# Patient Record
Sex: MALE | Race: WHITE | NOT HISPANIC OR LATINO | Employment: UNEMPLOYED | URBAN - NONMETROPOLITAN AREA
[De-identification: names, ages, dates, MRNs, and addresses within clinical notes are randomized per-mention and may not be internally consistent; named-entity substitution may affect disease eponyms.]

---
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
## 2022-07-08 ENCOUNTER — TELEPHONE (OUTPATIENT)
Dept: FAMILY MEDICINE CLINIC | Facility: CLINIC | Age: 22
End: 2022-07-08

## 2023-07-31 ENCOUNTER — OFFICE VISIT (OUTPATIENT)
Dept: FAMILY MEDICINE CLINIC | Facility: CLINIC | Age: 23
End: 2023-07-31
Payer: COMMERCIAL

## 2023-07-31 VITALS
TEMPERATURE: 99.3 F | WEIGHT: 136.2 LBS | DIASTOLIC BLOOD PRESSURE: 72 MMHG | HEIGHT: 68 IN | HEART RATE: 110 BPM | OXYGEN SATURATION: 97 % | RESPIRATION RATE: 20 BRPM | BODY MASS INDEX: 20.64 KG/M2 | SYSTOLIC BLOOD PRESSURE: 124 MMHG

## 2023-07-31 DIAGNOSIS — J34.2 DEVIATED SEPTUM: ICD-10-CM

## 2023-07-31 DIAGNOSIS — Z00.00 ANNUAL PHYSICAL EXAM: Primary | ICD-10-CM

## 2023-07-31 DIAGNOSIS — F41.9 ANXIETY: ICD-10-CM

## 2023-07-31 PROCEDURE — 99395 PREV VISIT EST AGE 18-39: CPT | Performed by: NURSE PRACTITIONER

## 2023-07-31 NOTE — PROGRESS NOTES
"Chief Complaint  Annual Exam    Subjective        Tristian Velázquez presents to [facility]  History of Present Illness    The patient is a 22-year-old male who presents to the clinic today for an annual exam.    The patient reports that he believes he has a deviated septum because it is always harder to breathe on the left side of his nostril. He has not had an evaluation in regards to this. The patient reports that it does not bother him that much.    The patient denies smoking, drinking, or using drugs. He states that he is not sexually active. The patient reports that he exercises 4 days a week. He states that he does weightlifting. He monitors his sugar intake and sodium intake. The patient reports that he does not eat fruits and vegetables. He states that he wears his seatbelt all the time. The patient denies any high-risk activities like skydiving or bass jumping. He states that he is still playing music. The patient reports that he is urinating and having normal bowel movements. He denies any issues with his penis or testicles.    The patient reports that his anxiety has improved. He states that he does not take propranolol at all. He notes that he did have a job interview that was pretty bad. The patient reports that he did not think to take the propranolol prior to the interview.     Objective   Vital Signs:  /72 (BP Location: Left arm, Patient Position: Sitting, Cuff Size: Adult)   Pulse 110   Temp 99.3 øF (37.4 øC) (Infrared)   Resp 20   Ht 172.7 cm (68\")   Wt 61.8 kg (136 lb 3.2 oz)   SpO2 97%   BMI 20.71 kg/mý   Estimated body mass index is 20.71 kg/mý as calculated from the following:    Height as of this encounter: 172.7 cm (68\").    Weight as of this encounter: 61.8 kg (136 lb 3.2 oz).  Facility age limit for growth percentiles is 20 years.    BMI is within normal parameters. No other follow-up for BMI required.      Physical Exam  Vitals and nursing note " reviewed.   Constitutional:       General: He is not in acute distress.     Appearance: He is well-developed.   HENT:      Head: Normocephalic and atraumatic.      Right Ear: Tympanic membrane and ear canal normal.      Left Ear: Tympanic membrane and ear canal normal.      Nose: Nose normal.      Right Sinus: No maxillary sinus tenderness or frontal sinus tenderness.      Left Sinus: No maxillary sinus tenderness or frontal sinus tenderness.      Mouth/Throat:      Mouth: Mucous membranes are moist.      Pharynx: Oropharynx is clear. Uvula midline. No uvula swelling.   Eyes:      Conjunctiva/sclera: Conjunctivae normal.   Neck:      Thyroid: No thyromegaly.      Trachea: No tracheal deviation.   Cardiovascular:      Rate and Rhythm: Normal rate and regular rhythm.      Pulses: Normal pulses.      Heart sounds: Normal heart sounds.   Pulmonary:      Effort: Pulmonary effort is normal.      Breath sounds: Normal breath sounds.   Abdominal:      General: Bowel sounds are normal.      Palpations: Abdomen is soft. There is no mass.      Tenderness: There is no abdominal tenderness.   Musculoskeletal:      Cervical back: Normal range of motion and neck supple.   Lymphadenopathy:      Cervical: No cervical adenopathy.   Skin:     General: Skin is warm and dry.   Neurological:      General: No focal deficit present.      Mental Status: He is alert and oriented to person, place, and time.   Psychiatric:         Mood and Affect: Mood normal.         Behavior: Behavior normal.      Result Review :                   Assessment and Plan   Diagnoses and all orders for this visit:    1. Annual physical exam (Primary)  -     CBC & Differential  -     Lipid panel  -     Comprehensive metabolic panel    2. Deviated septum    3. Anxiety    1. Annual exam  - Patient will obtain laboratories today.   - Advised the patient to do a testicular examination once a month.  - Advised to add fruits and vegetables to his diet.    Counseling/Anticipatory Guidance: encouraged healthy nutrition, physical activity, healthy weight, injury prevention, continue avoidance of misuse of tobacco, alcohol and drugs, discussed healthy sexual behavior and STDs, encouraged routine dental health, mental health visits, discussed recommended immunizations, screenings via hm.   For Women: Breast cancer and self breast exams  Screening Services: Cholesterol every five years beginning at 20 years of age, chlamydia for sexually active women under 25 years of age, cervical cancer, HIV      2. Deviated septum  - Continue to monitor symptoms. offered referral to an Ears, Nose and Throat specialist if his symptoms worsen and become bothersome.     3. Anxiety  - chronic stable         Follow Up   Return in about 1 year (around 7/31/2024) for Annual physical.  Patient was given instructions and counseling regarding his condition or for health maintenance advice. Please see specific information pulled into the AVS if appropriate.       Transcribed from ambient dictation for HARDIK Diaz by Peg Diaz.  07/31/23   13:26 CDT    Patient or patient representative verbalized consent to the visit recording.  I have personally performed the services described in this document as transcribed by the above individual, and it is both accurate and complete.

## 2023-08-01 LAB
ALBUMIN SERPL-MCNC: 5.1 G/DL (ref 3.5–5.2)
ALBUMIN/GLOB SERPL: 2.4 G/DL
ALP SERPL-CCNC: 166 U/L (ref 39–117)
ALT SERPL-CCNC: 25 U/L (ref 1–41)
AST SERPL-CCNC: 21 U/L (ref 1–40)
BASOPHILS # BLD AUTO: 0.12 10*3/MM3 (ref 0–0.2)
BASOPHILS NFR BLD AUTO: 1.5 % (ref 0–1.5)
BILIRUB SERPL-MCNC: 0.5 MG/DL (ref 0–1.2)
BUN SERPL-MCNC: 9 MG/DL (ref 6–20)
BUN/CREAT SERPL: 10.2 (ref 7–25)
CALCIUM SERPL-MCNC: 10 MG/DL (ref 8.6–10.5)
CHLORIDE SERPL-SCNC: 102 MMOL/L (ref 98–107)
CHOLEST SERPL-MCNC: 150 MG/DL (ref 0–200)
CO2 SERPL-SCNC: 24.4 MMOL/L (ref 22–29)
CREAT SERPL-MCNC: 0.88 MG/DL (ref 0.76–1.27)
EGFRCR SERPLBLD CKD-EPI 2021: 126.2 ML/MIN/1.73
EOSINOPHIL # BLD AUTO: 0.33 10*3/MM3 (ref 0–0.4)
EOSINOPHIL NFR BLD AUTO: 4.3 % (ref 0.3–6.2)
ERYTHROCYTE [DISTWIDTH] IN BLOOD BY AUTOMATED COUNT: 12.8 % (ref 12.3–15.4)
GLOBULIN SER CALC-MCNC: 2.1 GM/DL
GLUCOSE SERPL-MCNC: 85 MG/DL (ref 65–99)
HCT VFR BLD AUTO: 50.6 % (ref 37.5–51)
HDLC SERPL-MCNC: 38 MG/DL (ref 40–60)
HGB BLD-MCNC: 17.1 G/DL (ref 13–17.7)
IMM GRANULOCYTES # BLD AUTO: 0.02 10*3/MM3 (ref 0–0.05)
IMM GRANULOCYTES NFR BLD AUTO: 0.3 % (ref 0–0.5)
LDLC SERPL CALC-MCNC: 99 MG/DL (ref 0–100)
LYMPHOCYTES # BLD AUTO: 2.77 10*3/MM3 (ref 0.7–3.1)
LYMPHOCYTES NFR BLD AUTO: 35.7 % (ref 19.6–45.3)
MCH RBC QN AUTO: 29.9 PG (ref 26.6–33)
MCHC RBC AUTO-ENTMCNC: 33.8 G/DL (ref 31.5–35.7)
MCV RBC AUTO: 88.6 FL (ref 79–97)
MONOCYTES # BLD AUTO: 0.59 10*3/MM3 (ref 0.1–0.9)
MONOCYTES NFR BLD AUTO: 7.6 % (ref 5–12)
NEUTROPHILS # BLD AUTO: 3.92 10*3/MM3 (ref 1.7–7)
NEUTROPHILS NFR BLD AUTO: 50.6 % (ref 42.7–76)
NRBC BLD AUTO-RTO: 0 /100 WBC (ref 0–0.2)
PLATELET # BLD AUTO: 363 10*3/MM3 (ref 140–450)
POTASSIUM SERPL-SCNC: 4.2 MMOL/L (ref 3.5–5.2)
PROT SERPL-MCNC: 7.2 G/DL (ref 6–8.5)
RBC # BLD AUTO: 5.71 10*6/MM3 (ref 4.14–5.8)
SODIUM SERPL-SCNC: 141 MMOL/L (ref 136–145)
TRIGL SERPL-MCNC: 65 MG/DL (ref 0–150)
VLDLC SERPL CALC-MCNC: 13 MG/DL (ref 5–40)
WBC # BLD AUTO: 7.75 10*3/MM3 (ref 3.4–10.8)

## 2023-08-15 ENCOUNTER — TELEPHONE (OUTPATIENT)
Dept: FAMILY MEDICINE CLINIC | Facility: CLINIC | Age: 23
End: 2023-08-15
Payer: COMMERCIAL

## 2024-01-24 ENCOUNTER — TELEPHONE (OUTPATIENT)
Dept: FAMILY MEDICINE CLINIC | Facility: CLINIC | Age: 24
End: 2024-01-24
Payer: COMMERCIAL

## 2024-01-24 NOTE — TELEPHONE ENCOUNTER
Caller: RUPAL DAVALOS     Relationship: MOTHER     Best call back number: [phone]    What is the medical concern/diagnosis: DEVIATED SEPTUM     What specialty or service is being requested: ENT     What is the provider, practice or medical service name: DID NOT STATE     What is the office location: DID NOT STATE     What is the office phone number:  DID NOT STATE     Any additional details: CALL BACK REQUESTED

## 2024-01-29 ENCOUNTER — OFFICE VISIT (OUTPATIENT)
Dept: FAMILY MEDICINE CLINIC | Facility: CLINIC | Age: 24
End: 2024-01-29
Payer: COMMERCIAL

## 2024-01-29 VITALS
TEMPERATURE: 97.8 F | OXYGEN SATURATION: 98 % | BODY MASS INDEX: 21.67 KG/M2 | RESPIRATION RATE: 20 BRPM | HEIGHT: 68 IN | WEIGHT: 143 LBS | SYSTOLIC BLOOD PRESSURE: 125 MMHG | HEART RATE: 97 BPM | DIASTOLIC BLOOD PRESSURE: 76 MMHG

## 2024-01-29 DIAGNOSIS — J34.2 DEVIATED SEPTUM: Primary | ICD-10-CM

## 2024-01-29 PROCEDURE — 99213 OFFICE O/P EST LOW 20 MIN: CPT | Performed by: NURSE PRACTITIONER

## 2024-01-29 NOTE — PROGRESS NOTES
"Chief Complaint  Deviated Septum (Pt states that he has a deviated septum and wants a referral to ENT )    Subjective        Tristian Velázquez presents to [facility]  History of Present Illness  Here for referral  Reports he is noticing more issues with deviated septum  No injury  Reports when he is laying down, especially on right side has trouble breathing through his nose   Reports left nostril gets blocked       Objective   Vital Signs:  /76 (BP Location: Right arm, Patient Position: Sitting, Cuff Size: Adult)   Pulse 97   Temp 97.8 °F (36.6 °C) (Infrared)   Resp 20   Ht 172.7 cm (68\")   Wt 64.9 kg (143 lb)   SpO2 98%   BMI 21.74 kg/m²   Estimated body mass index is 21.74 kg/m² as calculated from the following:    Height as of this encounter: 172.7 cm (68\").    Weight as of this encounter: 64.9 kg (143 lb).       BMI is within normal parameters. No other follow-up for BMI required.      Physical Exam  Vitals and nursing note reviewed.   Constitutional:       Appearance: He is well-developed.   HENT:      Head: Normocephalic and atraumatic.      Nose: Nasal deformity, septal deviation and congestion present. No nasal tenderness.      Left Turbinates: Enlarged.   Eyes:      Conjunctiva/sclera: Conjunctivae normal.   Cardiovascular:      Rate and Rhythm: Normal rate and regular rhythm.   Pulmonary:      Effort: Pulmonary effort is normal.   Musculoskeletal:      Cervical back: Normal range of motion.   Neurological:      General: No focal deficit present.      Mental Status: He is alert and oriented to person, place, and time.   Psychiatric:         Mood and Affect: Mood normal.         Behavior: Behavior normal.        Result Review :                     Assessment and Plan     Diagnoses and all orders for this visit:    1. Deviated septum (Primary)  -     Ambulatory Referral to ENT (Otolaryngology)               Follow Up     Return if symptoms worsen or fail to " improve.  Patient was given instructions and counseling regarding his condition or for health maintenance advice. Please see specific information pulled into the AVS if appropriate.         Answers submitted by the patient for this visit:  Primary Reason for Visit (Submitted on 1/24/2024)  What is the primary reason for your visit?: Other  Other (Submitted on 1/24/2024)  Please describe your symptoms.: Trouble breathing through nose  Have you had these symptoms before?: Yes  How long have you been having these symptoms?: Greater than 2 weeks  Please describe any probable cause for these symptoms. : Deviated septum

## 2024-03-11 ENCOUNTER — OFFICE VISIT (OUTPATIENT)
Dept: OTOLARYNGOLOGY | Facility: CLINIC | Age: 24
End: 2024-03-11
Payer: COMMERCIAL

## 2024-03-11 VITALS
HEART RATE: 81 BPM | WEIGHT: 145 LBS | BODY MASS INDEX: 21.98 KG/M2 | DIASTOLIC BLOOD PRESSURE: 72 MMHG | SYSTOLIC BLOOD PRESSURE: 118 MMHG | TEMPERATURE: 98 F | HEIGHT: 68 IN

## 2024-03-11 DIAGNOSIS — J34.2 ACQUIRED DEVIATED NASAL SEPTUM: Primary | ICD-10-CM

## 2024-03-11 DIAGNOSIS — J34.89 NASAL OBSTRUCTION: ICD-10-CM

## 2024-03-11 DIAGNOSIS — J30.9 ALLERGIC RHINITIS, UNSPECIFIED SEASONALITY, UNSPECIFIED TRIGGER: ICD-10-CM

## 2024-03-11 DIAGNOSIS — J34.3 NASAL TURBINATE HYPERTROPHY: ICD-10-CM

## 2024-03-11 RX ORDER — OXYMETAZOLINE HYDROCHLORIDE 0.05 G/100ML
2 SPRAY NASAL
OUTPATIENT
Start: 2024-03-11

## 2024-03-11 RX ORDER — OXYMETAZOLINE HYDROCHLORIDE 0.05 G/100ML
2 SPRAY NASAL
OUTPATIENT
Start: 2024-03-11 | End: 2024-03-11

## 2024-03-11 RX ORDER — OXYMETAZOLINE HYDROCHLORIDE 0.05 G/100ML
2 SPRAY NASAL 3 TIMES DAILY
Qty: 2 ML | Refills: 0 | Status: SHIPPED | OUTPATIENT
Start: 2024-03-11 | End: 2024-03-14

## 2024-03-11 RX ORDER — FLUTICASONE PROPIONATE 50 MCG
2 SPRAY, SUSPENSION (ML) NASAL 2 TIMES DAILY
Qty: 48 G | Refills: 3 | Status: SHIPPED | OUTPATIENT
Start: 2024-03-11

## 2024-03-11 RX ORDER — OXYMETAZOLINE HYDROCHLORIDE 0.05 G/100ML
2 SPRAY NASAL ONCE
OUTPATIENT
Start: 2024-03-11 | End: 2024-03-11

## 2024-03-11 NOTE — PATIENT INSTRUCTIONS
NASAL SALINE:  Use 2 puffs each nostril 4-6 times daily and more frequently if possible.  You can buy saline spray or you can make your own and use an old spray bottle to administer  Use a humidifier at bedside  Recipe for saline:  Water                                 1 quart  Salt (table)                        1 tablespoon  Gylcerin (or Karlee Syrup)    1 teaspoon  Sodium bicarbonate           1 teaspoon  Sprays or Gayatri pots are recommended    Do not allow to stand for more than 24 hrs. Make new solution. There is no preservative in this solution.    Sinus irrigation and saline application can be enhanced with various over-the-counter products.  A WaterPik can be quite useful to irrigate, especially following sinus surgery.  Navage makes a product that irrigates the nose and some of the sinuses.  NeilMed makes a sign you Gator to irrigate the sinuses.  Neomed also has canned saline that we will come out under pressure.  A [place] pot can also be helpful.  All of these products help keep the nose clear of debris.  Please use as directed on the instructions that come with the particular device.     Afrin use:  Use 2 puffs each nostril 3 times daily for 3 days only!!  Stop using after 3 days unless instructed to use longer    Nasal steroid use:  Using nasal steroids:  You will be prescribed one of the following nasal steroids: Flonase, Nasacort, Nasonex, Rhinocort, Qnasl, Zetonna  2 puffs each nostril 2 times daily  Start as soon as possible    Use Afrin first and wait 10 minutes to allow the nose to open. Then administer nasal steroids.     Jd Talbot Jr, MD has explained the risks, benefits and alternatives to the patient/patient’s representative, in clear and simple language.   Time was allowed for questions.  Risks of procedure include but are not limited to:    As a result of this procedure being performed, the material risks generally recognized are INFECTION, ALLERGIC REACTION, RISK OF ANESTHESIA, SEVERE  LOSS OF BLOOD, LOSS OR LOSS OF FUNCTION OF ANY LIMB OR ORGAN, PARALYSIS OR PARTIAL PARALYSIS, PARAPLEGIA OR QUADRIPLEGIA, DISFIGURING SCAR, BRAIN DAMAGE, CARDIAC ARREST OR DEATH, BLOOD LOSS NECESSITATING TRANSFUSION WHICH CARRIES THE RISK OF EXPOSURE TO AIDS, HEPATITIS OR OTHER INFECTIOUS DISEASES.      Procedure: Septoplasty, turbinate surgery, Nasal valve reconstruction, Nasal reconstruction for trauma    Risks specific for procedure: pain, bleeding (with the possible need for nasal packing), infection, risks of the anesthesia, possible incomplete response to anesthesia requiring a conversion to a general anesthesia at a later date, orbital injury with blurred vision or visual loss, cerebrospinal fluid leak, injury to the brain, meningitis, intracranial bleeding, stroke, persistent disease and/ or symptoms, scarring, synechiae and the possibility for the need of reoperation. Worsening appearance of nose, collapse of nose, loss of cartilage or perforation of cartilage, scarring of the inside or outside of nose, poor cosmetic result, Worsening breathing, extrusion implants. Possibilities of additional nasal work or less nasal work depending on the status of the nose at the time of the operation was discussed.    No guarantees of outcome given or implied  Patient demonstrate understanding    Patient does wish to  proceed with proposed procedure     Call for problems     CONTACT INFORMATION:  The main office phone number is [phone]. For emergencies after hours and on weekends, this number will convert over to our answering service and the on call provider will answer. Please try to keep non emergent phone calls/ questions to office hours 9am-5pm Monday through Friday.     [email or web address]  As an alternative, you can sign up and use the Epic MyChart system for more direct and quicker access for non emergent questions/ problems.  [employer] allows you to send messages to your doctor, view your test results,  renew your prescriptions, schedule appointments, and more. To sign up, go to [email or web address] and click on the Sign Up Now link in the New User? box. Enter your [employer] Activation Code exactly as it appears below along with the last four digits of your Social Security Number and your Date of Birth () to complete the sign-up process. If you do not sign up before the expiration date, you must request a new code.    [employer] Activation Code: Activation code not generated  Current [employer] Status: Active    If you have questions, you can email [email or web address] or call [phone] to talk to our [employer] staff. Remember, [employer] is NOT to be used for urgent needs. For medical emergencies, dial 911.

## 2024-03-11 NOTE — LETTER
March 11, 2024       No Recipients    Patient: Tristian Velázquez   YOB: 2000   Date of Visit: 3/11/2024     Dear HARDIK Diaz:       Thank you for referring Tristian Velázquez to me for evaluation. Below are the relevant portions of my assessment and plan of care.    If you have questions, please do not hesitate to call me. I look forward to following Tristian along with you.         Sincerely,        Jd Talbot Jr, MD        CC:   No Recipients    Jd Talbot Jr., MD  03/11/24 1520  Incomplete      Jd Talbot Jr, MD  [facility] [place] [facility] EAR NOSE & THROAT  [address], [address]  [facility] [facility] [address]  Fax [phone]  Phone [phone]      Visit Type: NEW PATIENT   Chief Complaint   Patient presents with   • deviated septum     Pt would like to see if he should get his deviated septum corrected. Referred by PCP      {Problem List  Visit Diagnosis   Encounters  Notes  Medications  Labs  Result Review Imaging  Media :23}     HPI  Accompanied by:No one  He complains of deviated deptum.   He has had L sided breathing issues for 1 year.  He states worse with lying down.  Smoke- none  Drink- occasionally.  Allergies- none  PCP says he has dev septum    Past Medical History:   Diagnosis Date   • Anxiety 03/31/2021       Past Surgical History:   Procedure Laterality Date   • TONSILLECTOMY         Family History: His family history includes Hyperlipidemia in his father.     Social History: He  reports that he has never smoked. He has never used smokeless tobacco. He reports current alcohol use of about 2.0 standard drinks of alcohol per week. He reports that he does not use drugs.    Home Medications:  fluticasone and oxymetazoline    Allergies:  He has No Known Allergies.       Vital Signs:   Temp:  [98 °F (36.7 °C)] 98 °F (36.7 °C)  Heart Rate:  [81] 81  BP: (118)/(72) 118/72  ENT Physical  Exam  Constitutional  Appearance: patient appears well-developed and well-nourished,  Communication/Voice: communication appropriate for developmental age; vocal quality normal;  Head and Face  Appearance: head appears normal, face appears normal and face appears atraumatic;  Palpation: facial palpation normal;  Salivary: glands normal;  Ear  Hearing: intact;  Auricles: bilateral auricles normal;  External Mastoids: right external mastoid normal; left external mastoid normal;  Ear Canals: bilateral ear canals normal;  Tympanic Membranes: bilateral tympanic membranes normal;  Nose  External Nose: nares patent bilaterally; external nose normal;  Internal Nose: nasal mucosa normal; nasal septal deviation present; deviation is S-shaped, to the left, septal deviation is severe; Septum comments: L to R low mild to moderate; R to L mid severe   bilateral inferior turbinates erythematous; with hypertrophy;  Oral Cavity/Oropharynx  Lips: normal;  Teeth: normal;  Gums: gingiva normal;  Tongue: normal;  Oral mucosa: normal;  Hard palate: normal;  Soft palate: normal;  Tonsils: normal;  Base of Tongue: normal;  Posterior pharyngeal wall: normal;  Neck  Neck: neck normal;  Respiratory  Inspection: breathing unlabored; normal breathing rate;  Cardiovascular  Inspection: extremities are warm and well perfused; no peripheral edema present;  Neurovestibular  Mental Status: alert and oriented;  Psychiatric: mood normal; affect is appropriate;       Nasal endoscopy    Date/Time: 3/11/2024 3:18 PM    Performed by: Jd Talbot Jr., MD  Authorized by: Jd Talbot Jr., MD    Consent:     Consent obtained:  Verbal    Consent given by:  Patient    Alternatives discussed:  No treatment  Anesthesia (see MAR for exact dosages):     Anesthesia method:  Topical application    Topical anesthetic:  Tetracaine  Procedure details:     Indications: sino-nasal symptoms      Medication:  Afrin    Instrument: rigid nasal endoscopy       Scope location: bilateral nare    Nasal cavity:     left nasal cavity occluded (Partially by deviated nasal septum and inferior turbinate)      right nasal cavity not occluded      Right inferior turbinates: edema and enlarged      Left inferior turbinates: edema and enlarged    Septum:     Deviation: deviated to the right and deviated to the left      Deviation comment:  Left right deviation inferiorly mild to moderate; right to left deviation mid and high severe  Sinus/ Nasopharynx:     Right middle meatus: normal and patent      Left middle meatus: normal and patent      Right nasopharynx: normal      patent      Left nasopharynx: normal      patent      Right eustachian tube: normal      patent      Left eustachian tube: normal      patent    Post-procedure details:     Patient tolerance of procedure:  Tolerated well  Comments:      Deviated nasal septum causing left greater than right nasal obstruction  Patent middle meatus with no evidence of mucopus     Result Review {Labs  Result Review  Imaging  Med Tab  Media  Procedures :23}     RESULTS REVIEW    I have reviewed the patients old records in the chart.   I have reviewed the patients old records in the chart.  The following results/records were reviewed:   Referral to Otolaryngology for Deviated septum (01/29/2024)   Progress Notes by Jorge Pereyra APRN (01/29/2024 08:45)   {CC Problem List  Visit Diagnosis   ROS  Review (Popup)  Health Maintenance  Quality  BestPractice  Medications  SmartSets  SnapShot Encounters  Media :23}     Assessment & Plan  Acquired deviated nasal septum    Allergic rhinitis, unspecified seasonality, unspecified trigger    Nasal turbinate hypertrophy    Nasal obstruction        Diagnosis Plan   1. Acquired deviated nasal septum  Case Request    oxymetazoline (AFRIN) nasal spray 2 spray    oxymetazoline (AFRIN) nasal spray 2 spray    oxymetazoline (AFRIN) nasal spray 2 spray    dexAMETHasone (DECADRON) 8 mg  in sodium chloride 0.9 % IVPB    Case Request    Left right low-mild  Right to left mid and high moderate to severe      2. Allergic rhinitis, unspecified seasonality, unspecified trigger  Case Request    oxymetazoline (AFRIN) nasal spray 2 spray    oxymetazoline (AFRIN) nasal spray 2 spray    oxymetazoline (AFRIN) nasal spray 2 spray    dexAMETHasone (DECADRON) 8 mg in sodium chloride 0.9 % IVPB    Case Request    Untreated      3. Nasal turbinate hypertrophy  Case Request    oxymetazoline (AFRIN) nasal spray 2 spray    oxymetazoline (AFRIN) nasal spray 2 spray    oxymetazoline (AFRIN) nasal spray 2 spray    dexAMETHasone (DECADRON) 8 mg in sodium chloride 0.9 % IVPB    Case Request    Causing left greater than right nasal obstruction      4. Nasal obstruction      Left greater than right, with inferior turbinate hypertrophy and deviated nasal septum          Orders Placed This Encounter   Procedures   • Provide Patient With Instructions on NPO Status   • $ Nasal / Sinus Endoscopy      New Medications Ordered This Visit   Medications   • oxymetazoline (AFRIN) 0.05 % nasal spray     Si sprays into the nostril(s) as directed by provider 3 (Three) Times a Day for 3 days. Use for 3 days then stop     Dispense:  2 mL     Refill:  0   • fluticasone (FLONASE) 50 MCG/ACT nasal spray     Si sprays into the nostril(s) as directed by provider 2 (Two) Times a Day.     Dispense:  48 g     Refill:  3     Medical and surgical options were discussed including observation, continued medical management, medication modification, and surgical management. Risks, benefits and alternatives were discussed and questions were answered. After considering the options, the patient decided to proceed with surgical management.  Medical and surgical options were discussed including medical and surgical options. Risks, benefits and alternatives were discussed and questions were answered. After considering the options, the patient decided  to proceed with surgery.     -----SURGERY SCHEDULING:-----  Schedule septoplasty with turbinate reduction (Bilateral), after next visit    ---INFORMED CONSENT DISCUSSION:---  SEPTOPLASTY AND TURBINOPLASTY: A septoplasty and inferior turbinoplasty were recommended. The risks and benefits were explained including but not limited to pain, bleeding, infection, risks of the general anesthesia, continued septal deviation, crusting, congestion and septal perforation. Possibilities of continued preoperative symptoms and the possible need for revision surgery and or medical therapy were discussed. Alternatives were discussed. No guarantees were made or implied. Questions were asked appropriately answered.      ---PREOPERATIVE WORKUP:---  labs/ workup per anesthesia  Patient appears to have significant deviated nasal septum right to left with inferior left to right.  This is causing mostly left nasal obstruction especially in conjunction with turbinate hypertrophy.  I have discussed risk, benefits, alternative treatments, options with the patient regarding septal surgery.  He also noted some improvement in breathing with decongestion.  He wishes to try medication for 6 weeks prior to considering surgery.  I feel this is a reasonable course of action.  Flonase twice daily  Afrin x 3 days  Nasal saline  Call for problems  Consider septoplasty, turbinate surgery    My Chart:  Patient is using My Chart    Patient understand(s) and agree(s) with the treatment plan as described.    Return in about 6 weeks (around 4/22/2024) for Recheck nose,.  {Instructions Charge Capture  Follow-up Communications :23}      Electronically signed by Jd Talbot Jr, MD 03/11/24 3:05 PM CDT.         Jd Talbot Jr., MD  03/11/24 6891  Sign when Signing Visit      Jd Talbot Jr, MD  [facility] [place] [facility] EAR NOSE & THROAT  [address], [address]  [place] [address]  Fax  [phone]  Phone [phone]      Visit Type: NEW PATIENT   Chief Complaint   Patient presents with   • deviated septum     Pt would like to see if he should get his deviated septum corrected. Referred by PCP      {Problem List  Visit Diagnosis   Encounters  Notes  Medications  Labs  Result Review Imaging  Media :23}     HPI  Accompanied by:No one  He complains of deviated deptum.   He has had L sided breathing issues for 1 year.  He states worse with lying down.  Smoke- none  Drink- occasionally.  Allergies- none  PCP says he has dev septum    Past Medical History:   Diagnosis Date   • Anxiety 03/31/2021       Past Surgical History:   Procedure Laterality Date   • TONSILLECTOMY         Family History: His family history includes Hyperlipidemia in his father.     Social History: He  reports that he has never smoked. He has never used smokeless tobacco. He reports current alcohol use of about 2.0 standard drinks of alcohol per week. He reports that he does not use drugs.    Home Medications:       Allergies:  He has No Known Allergies.       Vital Signs:   Temp:  [98 °F (36.7 °C)] 98 °F (36.7 °C)  Heart Rate:  [81] 81  BP: (118)/(72) 118/72  ENT Physical Exam  Constitutional  Appearance: patient appears well-developed and well-nourished,  Communication/Voice: communication appropriate for developmental age; vocal quality normal;  Head and Face  Appearance: head appears normal, face appears normal and face appears atraumatic;  Palpation: facial palpation normal;  Salivary: glands normal;  Ear  Hearing: intact;  Auricles: bilateral auricles normal;  External Mastoids: right external mastoid normal; left external mastoid normal;  Ear Canals: bilateral ear canals normal;  Tympanic Membranes: bilateral tympanic membranes normal;  Nose  External Nose: nares patent bilaterally; external nose normal;  Internal Nose: nasal mucosa normal; nasal septal deviation present; deviation is S-shaped, to the left, septal  deviation is severe; Septum comments: L to R low mild to moderate; R to L mid severe   bilateral inferior turbinates erythematous; with hypertrophy;  Oral Cavity/Oropharynx  Lips: normal;  Teeth: normal;  Gums: gingiva normal;  Tongue: normal;  Oral mucosa: normal;  Hard palate: normal;  Soft palate: normal;  Tonsils: normal;  Base of Tongue: normal;  Posterior pharyngeal wall: normal;  Neck  Neck: neck normal;  Respiratory  Inspection: breathing unlabored; normal breathing rate;  Cardiovascular  Inspection: extremities are warm and well perfused; no peripheral edema present;  Neurovestibular  Mental Status: alert and oriented;  Psychiatric: mood normal; affect is appropriate;           Result Review {Labs  Result Review  Imaging  Med Tab  Media  Procedures :23}     RESULTS REVIEW    I have reviewed the patients old records in the chart.   I have reviewed the patients old records in the chart.  The following results/records were reviewed:   Referral to Otolaryngology for Deviated septum (01/29/2024)   Progress Notes by Jorge Pereyra APRN (01/29/2024 08:45)   {CC Problem List  Visit Diagnosis   ROS  Review (Popup)  [facility] Maintenance  Quality  BestPractice  Medications  SmartSets  SnapShot Encounters  Media :23}     Assessment & Plan  Acquired deviated nasal septum    Allergic rhinitis, unspecified seasonality, unspecified trigger    Nasal turbinate hypertrophy              Medical and surgical options were discussed including observation, continued medical management, medication modification, and surgical management. Risks, benefits and alternatives were discussed and questions were answered. After considering the options, the patient decided to proceed with surgical management.  Medical and surgical options were discussed including medical and surgical options. Risks, benefits and alternatives were discussed and questions were answered. After considering the options, the patient decided to  proceed with surgery.     -----SURGERY SCHEDULING:-----  Schedule * Surgery not found *    ---INFORMED CONSENT DISCUSSION:---  SEPTOPLASTY AND TURBINOPLASTY: A septoplasty and inferior turbinoplasty were recommended. The risks and benefits were explained including but not limited to pain, bleeding, infection, risks of the general anesthesia, continued septal deviation, crusting, congestion and septal perforation. Possibilities of continued preoperative symptoms and the possible need for revision surgery and or medical therapy were discussed. Alternatives were discussed. No guarantees were made or implied. Questions were asked appropriately answered.      ---PREOPERATIVE WORKUP:---  labs/ workup per anesthesia  ***    My Chart:  Patient is using My Chart    Patient understand(s) and agree(s) with the treatment plan as described.    No follow-ups on file.  {Instructions Charge Capture  Follow-up Communications :23}      Electronically signed by Jd Talbot Jr, MD 03/11/24 3:05 PM CDT.

## 2024-03-11 NOTE — PROGRESS NOTES
Jd Talbot Jr, MD  [facility] [place] [facility]SE & [facility]AT  [address], [address]  [place] [facility] [address]  Fax [phone]  Phone [phone]      Visit Type: NEW PATIENT   Chief Complaint   Patient presents with    deviated septum     Pt would like to see if he should get his deviated septum corrected. Referred by PCP           HPI  Accompanied by:No one  He complains of deviated deptum.   He has had L sided breathing issues for 1 year.  He states worse with lying down.  Smoke- none  Drink- occasionally.  Allergies- none  PCP says he has dev septum    Past Medical History:   Diagnosis Date    Anxiety 03/31/2021       Past Surgical History:   Procedure Laterality Date    TONSILLECTOMY         Family History: His family history includes Hyperlipidemia in his father.     Social History: He  reports that he has never smoked. He has never used smokeless tobacco. He reports current alcohol use of about 2.0 standard drinks of alcohol per week. He reports that he does not use drugs.    Home Medications:  fluticasone and oxymetazoline    Allergies:  He has No Known Allergies.       Vital Signs:   Temp:  [98 °F (36.7 °C)] 98 °F (36.7 °C)  Heart Rate:  [81] 81  BP: (118)/(72) 118/72  ENT Physical Exam  Constitutional  Appearance: patient appears well-developed and well-nourished,  Communication/Voice: communication appropriate for developmental age; vocal quality normal;  Head and Face  Appearance: head appears normal, face appears normal and face appears atraumatic;  Palpation: facial palpation normal;  Salivary: glands normal;  Ear  Hearing: intact;  Auricles: bilateral auricles normal;  External Mastoids: right external mastoid normal; left external mastoid normal;  Ear Canals: bilateral ear canals normal;  Tympanic Membranes: bilateral tympanic membranes normal;  Nose  External Nose: nares patent bilaterally; external nose normal;  Internal Nose: nasal mucosa  normal; nasal septal deviation present; deviation is S-shaped, to the left, septal deviation is severe; Septum comments: L to R low mild to moderate; R to L mid severe   bilateral inferior turbinates erythematous; with hypertrophy;  Oral Cavity/Oropharynx  Lips: normal;  Teeth: normal;  Gums: gingiva normal;  Tongue: normal;  Oral mucosa: normal;  Hard palate: normal;  Soft palate: normal;  Tonsils: normal;  Base of Tongue: normal;  Posterior pharyngeal wall: normal;  Neck  Neck: neck normal;  Respiratory  Inspection: breathing unlabored; normal breathing rate;  Cardiovascular  Inspection: extremities are warm and well perfused; no peripheral edema present;  Neurovestibular  Mental Status: alert and oriented;  Psychiatric: mood normal; affect is appropriate;       Nasal endoscopy    Date/Time: 3/11/2024 3:18 PM    Performed by: Jd Talbot Jr., MD  Authorized by: Jd Talbot Jr., MD    Consent:     Consent obtained:  Verbal    Consent given by:  Patient    Alternatives discussed:  No treatment  Anesthesia (see MAR for exact dosages):     Anesthesia method:  Topical application    Topical anesthetic:  Tetracaine  Procedure details:     Indications: sino-nasal symptoms      Medication:  Afrin    Instrument: rigid nasal endoscopy      Scope location: bilateral nare    Nasal cavity:     left nasal cavity occluded (Partially by deviated nasal septum and inferior turbinate)      right nasal cavity not occluded      Right inferior turbinates: edema and enlarged      Left inferior turbinates: edema and enlarged    Septum:     Deviation: deviated to the right and deviated to the left      Deviation comment:  Left right deviation inferiorly mild to moderate; right to left deviation mid and high severe  Sinus/ Nasopharynx:     Right middle meatus: normal and patent      Left middle meatus: normal and patent      Right nasopharynx: normal      patent      Left nasopharynx: normal      patent      Right  eustachian tube: normal      patent      Left eustachian tube: normal      patent    Post-procedure details:     Patient tolerance of procedure:  Tolerated well  Comments:      Deviated nasal septum causing left greater than right nasal obstruction  Patent middle meatus with no evidence of mucopus     Result Review       RESULTS REVIEW    I have reviewed the patients old records in the chart.   I have reviewed the patients old records in the chart.  The following results/records were reviewed:   Referral to Otolaryngology for Deviated septum (01/29/2024)   Progress Notes by Jorge Pereyra APRN (01/29/2024 08:45)        Assessment & Plan  Acquired deviated nasal septum    Allergic rhinitis, unspecified seasonality, unspecified trigger    Nasal turbinate hypertrophy    Nasal obstruction        Diagnosis Plan   1. Acquired deviated nasal septum  Case Request    oxymetazoline (AFRIN) nasal spray 2 spray    oxymetazoline (AFRIN) nasal spray 2 spray    oxymetazoline (AFRIN) nasal spray 2 spray    dexAMETHasone (DECADRON) 8 mg in sodium chloride 0.9 % IVPB    Case Request    Left right low-mild  Right to left mid and high moderate to severe      2. Allergic rhinitis, unspecified seasonality, unspecified trigger  Case Request    oxymetazoline (AFRIN) nasal spray 2 spray    oxymetazoline (AFRIN) nasal spray 2 spray    oxymetazoline (AFRIN) nasal spray 2 spray    dexAMETHasone (DECADRON) 8 mg in sodium chloride 0.9 % IVPB    Case Request    Untreated      3. Nasal turbinate hypertrophy  Case Request    oxymetazoline (AFRIN) nasal spray 2 spray    oxymetazoline (AFRIN) nasal spray 2 spray    oxymetazoline (AFRIN) nasal spray 2 spray    dexAMETHasone (DECADRON) 8 mg in sodium chloride 0.9 % IVPB    Case Request    Causing left greater than right nasal obstruction      4. Nasal obstruction      Left greater than right, with inferior turbinate hypertrophy and deviated nasal septum          Orders Placed This Encounter    Procedures    Provide Patient With Instructions on NPO Status    $ Nasal / Sinus Endoscopy      New Medications Ordered This Visit   Medications    oxymetazoline (AFRIN) 0.05 % nasal spray     Si sprays into the nostril(s) as directed by provider 3 (Three) Times a Day for 3 days. Use for 3 days then stop     Dispense:  2 mL     Refill:  0    fluticasone (FLONASE) 50 MCG/ACT nasal spray     Si sprays into the nostril(s) as directed by provider 2 (Two) Times a Day.     Dispense:  48 g     Refill:  3     Medical and surgical options were discussed including observation, continued medical management, medication modification, and surgical management. Risks, benefits and alternatives were discussed and questions were answered. After considering the options, the patient decided to proceed with surgical management.  Medical and surgical options were discussed including medical and surgical options. Risks, benefits and alternatives were discussed and questions were answered. After considering the options, the patient decided to proceed with surgery.     -----SURGERY SCHEDULING:-----  Schedule septoplasty with turbinate reduction (Bilateral), after next visit    ---INFORMED CONSENT DISCUSSION:---  SEPTOPLASTY AND TURBINOPLASTY: A septoplasty and inferior turbinoplasty were recommended. The risks and benefits were explained including but not limited to pain, bleeding, infection, risks of the general anesthesia, continued septal deviation, crusting, congestion and septal perforation. Possibilities of continued preoperative symptoms and the possible need for revision surgery and or medical therapy were discussed. Alternatives were discussed. No guarantees were made or implied. Questions were asked appropriately answered.      ---PREOPERATIVE WORKUP:---  labs/ workup per anesthesia  Patient appears to have significant deviated nasal septum right to left with inferior left to right.  This is causing mostly left nasal  obstruction especially in conjunction with turbinate hypertrophy.  I have discussed risk, benefits, alternative treatments, options with the patient regarding septal surgery.  He also noted some improvement in breathing with decongestion.  He wishes to try medication for 6 weeks prior to considering surgery.  I feel this is a reasonable course of action.  Flonase twice daily  Afrin x 3 days  Nasal saline  Call for problems  Consider septoplasty, turbinate surgery    My Chart:  Patient is using My Chart    Patient understand(s) and agree(s) with the treatment plan as described.    Return in about 6 weeks (around 4/22/2024) for Recheck nose,.        Electronically signed by Jd Talbot Jr, MD 03/11/24 3:05 PM CDT.

## 2024-04-24 ENCOUNTER — OFFICE VISIT (OUTPATIENT)
Dept: OTOLARYNGOLOGY | Facility: CLINIC | Age: 24
End: 2024-04-24
Payer: COMMERCIAL

## 2024-04-24 VITALS
TEMPERATURE: 98.6 F | SYSTOLIC BLOOD PRESSURE: 121 MMHG | DIASTOLIC BLOOD PRESSURE: 90 MMHG | HEIGHT: 68 IN | HEART RATE: 104 BPM | BODY MASS INDEX: 18.49 KG/M2 | WEIGHT: 122 LBS

## 2024-04-24 DIAGNOSIS — J34.3 NASAL TURBINATE HYPERTROPHY: ICD-10-CM

## 2024-04-24 DIAGNOSIS — J34.89 NASAL OBSTRUCTION: ICD-10-CM

## 2024-04-24 DIAGNOSIS — J34.2 ACQUIRED DEVIATED NASAL SEPTUM: Primary | ICD-10-CM

## 2024-04-24 DIAGNOSIS — J30.9 ALLERGIC RHINITIS, UNSPECIFIED SEASONALITY, UNSPECIFIED TRIGGER: ICD-10-CM

## 2024-04-24 NOTE — PROGRESS NOTES
Jd Talbot Jr, MD  [facility] EAR NOSE & THROAT  [address], [address]  [facility] [address]  Fax [phone]  Phone [phone]      Visit Type: FOLLOW UP   Chief Complaint   Patient presents with    recheck nose           HPI  Accompanied by: No one  He presents for a follow up evaluation. He has had minimal improvement.  He wishes to proceed with surgery     Past Medical History:   Diagnosis Date    Anxiety 03/31/2021       Past Surgical History:   Procedure Laterality Date    TONSILLECTOMY         Family History: His family history includes Hyperlipidemia in his father.     Social History: He  reports that he has never smoked. He has never used smokeless tobacco. He reports current alcohol use of about 2.0 standard drinks of alcohol per week. He reports that he does not use drugs.    Home Medications:  fluticasone    Allergies:  He has No Known Allergies.       Vital Signs:   Temp:  [98.6 °F (37 °C)] 98.6 °F (37 °C)  Heart Rate:  [104] 104  BP: (121)/(90) 121/90  ENT Physical Exam  Constitutional  Appearance: patient appears well-developed and well-nourished,  Communication/Voice: communication appropriate for developmental age; vocal quality normal;  Head and Face  Appearance: head appears normal, face appears normal and face appears atraumatic;  Palpation: facial palpation normal;  Salivary: glands normal;  Ear  Hearing: intact;  Auricles: bilateral auricles normal;  External Mastoids: right external mastoid normal; left external mastoid normal;  Ear Canals: bilateral ear canals normal;  Tympanic Membranes: bilateral tympanic membranes normal;  Nose  External Nose: nares patent bilaterally; external nose normal;  Internal Nose: nasal mucosa normal; nasal septal deviation present; deviation is S-shaped, to the left, septal deviation is severe; Septum comments: L to R low mild to moderate; R to L mid severe   bilateral inferior turbinates  erythematous; with hypertrophy;  Oral Cavity/Oropharynx  Lips: normal;  Teeth: normal;  Gums: gingiva normal;  Tongue: normal;  Oral mucosa: normal;  Hard palate: normal;  Soft palate: normal;  Tonsils: normal;  Base of Tongue: normal;  Posterior pharyngeal wall: normal;  Neck  Neck: neck normal;  Respiratory  Inspection: breathing unlabored; normal breathing rate;  Cardiovascular  Inspection: extremities are warm and well perfused; no peripheral edema present;  Neurovestibular  Mental Status: alert and oriented;  Psychiatric: mood normal; affect is appropriate;       Nasal Endoscopy    Date/Time: 4/24/2024 10:58 AM    Performed by: Jd Talbot Jr., MD  Authorized by: Jd Talbot Jr., MD    Consent:     Consent obtained:  Verbal    Consent given by:  Patient    Alternatives discussed:  No treatment  Anesthesia (see MAR for exact dosages):     Anesthesia method:  Topical application    Topical anesthetic:  Tetracaine  Procedure details:     Indications: sino-nasal symptoms      Medication:  Afrin    Instrument: rigid nasal endoscopy      Scope location: bilateral nare    Nasal cavity:     right nasal cavity occluded (Septum and turbinate) and left nasal cavity occluded (Anteriorly by septum and more posteriorly by turbinate and septum)      Right inferior turbinates: edema and enlarged      Left inferior turbinates: edema and enlarged    Septum:     Deviation: deviated to the right and deviated to the left      Severity of deviation: severe      Prominent septal vessels: anterior and posterior      Severity of septal vessels: severe    Sinus/ Nasopharynx:     Right middle meatus: normal and patent      Left middle meatus: normal and patent      patent      inflammation      patent      inflammation      patent      inflammation      patent      inflammation    Post-procedure details:     Patient tolerance of procedure:  Tolerated well  Comments:      Moderate inflammation bilaterally of the nasal  mucosa  Severe deviated nasal septum: Left to right low horizontal, moderate to severe, with obstruction exacerbated by turbinate hypertrophy; right to left with a bowing of the mid and superior anterior cartilage with more posterior obstruction exacerbated by turbinate  Middle meatus bilaterally is open and no mucopurulence     Result Review       RESULTS REVIEW    I have reviewed the patients old records in the chart.   I have reviewed the patients old records in the chart.        Assessment & Plan  Acquired deviated nasal septum    Allergic rhinitis, unspecified seasonality, unspecified trigger    Nasal turbinate hypertrophy    Nasal obstruction        Diagnosis Plan   1. Acquired deviated nasal septum  Nasal endoscopy    Left to right low moderate to severe  Right to left mid and hide moderate to severe especially anterior      2. Allergic rhinitis, unspecified seasonality, unspecified trigger  Nasal endoscopy    Improved with therapy      3. Nasal turbinate hypertrophy  Nasal endoscopy    Moderate with obstruction      4. Nasal obstruction  Nasal endoscopy    Bilateral          Orders Placed This Encounter   Procedures    Nasal endoscopy         Medical and surgical options were discussed including observation, continued medical management, medication modification, and surgical management. Risks, benefits and alternatives were discussed and questions were answered. After considering the options, the patient decided to proceed with surgical management.  Medical and surgical options were discussed including medical and surgical options. Risks, benefits and alternatives were discussed and questions were answered. After considering the options, the patient decided to proceed with surgery.     -----SURGERY SCHEDULING:-----  Schedule Septoplasty, Turbinoplasty    ---INFORMED CONSENT DISCUSSION:---  SEPTOPLASTY AND TURBINOPLASTY: A septoplasty and inferior turbinoplasty were recommended. The risks and benefits were  explained including but not limited to pain, bleeding, infection, risks of the general anesthesia, continued septal deviation, crusting, congestion and septal perforation. Possibilities of continued preoperative symptoms and the possible need for revision surgery and or medical therapy were discussed. Alternatives were discussed. No guarantees were made or implied. Questions were asked appropriately answered.      ---PREOPERATIVE WORKUP:---  labs/ workup per anesthesia  Patient has failed medical management.  He wishes to consider septoplasty turbinoplasty to alleviate his nasal obstruction.  I have discussed risk, benefits, alternative treatments, options with the patient.  He wishes to proceed with proposed procedure.  To continue his current medications for now.  Flonase twice daily  Nasal saline  Plan septoplasty, turbinoplasty    My Chart:  Patient is using My Chart    Patient understand(s) and agree(s) with the treatment plan as described.    Return RTC 2 weeks after surgery, for Recheck Nose, nasal endoscopy.        Electronically signed by Jd Talbot Jr, MD 04/24/24 11:00 AM CDT.

## 2024-04-24 NOTE — PATIENT INSTRUCTIONS
>NASAL SALINE:  Use 2 puffs each nostril 4-6 times daily and more frequently if possible.  You can buy saline spray or you can make your own and use an old spray bottle to administer  Use a humidifier at bedside  Recipe for saline:  Water                                 1 quart  Salt (table)                        1 tablespoon  Gylcerin (or Karlee Syrup)    1 teaspoon  Sodium bicarbonate           1 teaspoon  Sprays or Beemer pots are recommended    Do not allow to stand for more than 24 hrs. Make new solution. There is no preservative in this solution.    Sinus irrigation and saline application can be enhanced with various over-the-counter products.  A WaterPik can be quite useful to irrigate, especially following sinus surgery.  Navage makes a product that irrigates the nose and some of the sinuses.  NeilMed makes a Sinu-Gator to irrigate the sinuses.  Neomed also has canned saline that we will come out under pressure.  A Gayatri pot can also be helpful.  All of these products help keep the nose clear of debris.  Please use as directed on the instructions that come with the particular device.     >NASAL STEROID use:  Using nasal steroids:  You will be prescribed one of the following nasal steroids: Flonase, Nasacort, Nasonex, Rhinocort, Qnasl, Zetonna  2 puffs each nostril 2 times daily  Start as soon as possible  If you are using Afrin for 3 days with the nasal steroid,  Use Afrin first and wait 10 minutes to allow the nose to open. Then administer nasal steroids.     PREOPERATIVE SURGERY/PROCEDURE INSTRUCTIONS:  Do not eat or drink ANYTHING after midnight, unless instructed   Clean the operative site by showering with an antibacterial soap (like Dial, Dove, Ivory, etc) and shampooing hair  Preoperative scrub for Surgery:   Skin: Antibacterial soap (Dial, Ivory, Dove) shower daily, including hair.  Be careful not to get into eyes  Do this daily for 5 days  Mouth: Betadine solution 3 times daily for 5 days  Do NOT  pluck, shave hair on skin the night prior to operation  If you are diabetic, take your blood sugar the night before and in the morning prior to coming to hospital and give results to nurse and the anesthesiologist    Remove any metallic piercings prior to surgery. You may wear plastic spacers if needed.    Do NOT apply eye makeup Morning of surgery    Please remove fingernail polish prior to surgery    STOP:  -   All natural/homeopathic medications 2 weeks prior to surgery, Ask about over the counter medications  -   Smoking 2 weeks prior to surgery  -   Blood thinners- 3-5 days prior to surgery (or as instructed by doctor)  Bring with you the morning of surgery:  -   Preoperative paperwork  -   Insurance card  -   Identification with photo  -   Home medications or up to date list     Jd Talbot Jr, MD has explained the risks, benefits and alternatives to the patient/patient’s representative, in clear and simple language.   Time was allowed for questions.  Risks of procedure include but are not limited to:    As a result of this procedure being performed, the material risks generally recognized are INFECTION, ALLERGIC REACTION, RISK OF ANESTHESIA, SEVERE LOSS OF BLOOD, LOSS OR LOSS OF FUNCTION OF ANY LIMB OR ORGAN, PARALYSIS OR PARTIAL PARALYSIS, PARAPLEGIA OR QUADRIPLEGIA, DISFIGURING SCAR, BRAIN DAMAGE, CARDIAC ARREST OR DEATH, BLOOD LOSS NECESSITATING TRANSFUSION WHICH CARRIES THE RISK OF EXPOSURE TO AIDS, HEPATITIS OR OTHER INFECTIOUS DISEASES.      Procedure: Septoplasty, turbinate surgery, Nasal valve reconstruction, Nasal reconstruction for trauma    Risks specific for procedure: pain, bleeding (with the possible need for nasal packing), infection, risks of the anesthesia, possible incomplete response to anesthesia requiring a conversion to a general anesthesia at a later date, orbital injury with blurred vision or visual loss, cerebrospinal fluid leak, injury to the brain, meningitis, intracranial  bleeding, stroke, persistent disease and/ or symptoms, scarring, synechiae and the possibility for the need of reoperation. Worsening appearance of nose, collapse of nose, loss of cartilage or perforation of cartilage, scarring of the inside or outside of nose, poor cosmetic result, Worsening breathing, extrusion implants. Possibilities of additional nasal work or less nasal work depending on the status of the nose at the time of the operation was discussed.    No guarantees of outcome given or implied  Patient demonstrate understanding    Patient does wish to  proceed with proposed procedure      CONTACT INFORMATION:  The main office phone number is [phone]. For emergencies after hours and on weekends, this number will convert over to our answering service and the on call provider will answer. Please try to keep non emergent phone calls/ questions to office hours 9am-5pm Monday through Friday.     [employer]  As an alternative, you can sign up and use the Epic MyChart system for more direct and quicker access for non emergent questions/ problems.  [email or web address] allows you to send messages to your doctor, view your test results, renew your prescriptions, schedule appointments, and more. To sign up, go to [employer] and click on the Sign Up Now link in the New User? box. Enter your [employer] Activation Code exactly as it appears below along with the last four digits of your Social Security Number and your Date of Birth () to complete the sign-up process. If you do not sign up before the expiration date, you must request a new code.    [employer]leader Activation Code: Activation code not generated  Current Teamleader Status: Active    If you have questions, you can email [email or web address] or call [phone] to talk to our [employer] staff. Remember, [employer]leader is NOT to be used for urgent needs. For medical emergencies, dial 911.

## 2024-08-05 ENCOUNTER — OFFICE VISIT (OUTPATIENT)
Dept: FAMILY MEDICINE CLINIC | Facility: CLINIC | Age: 24
End: 2024-08-05
Payer: COMMERCIAL

## 2024-08-05 VITALS
BODY MASS INDEX: 22.34 KG/M2 | HEIGHT: 68 IN | HEART RATE: 101 BPM | WEIGHT: 147.4 LBS | DIASTOLIC BLOOD PRESSURE: 78 MMHG | SYSTOLIC BLOOD PRESSURE: 120 MMHG | RESPIRATION RATE: 20 BRPM | TEMPERATURE: 98.7 F

## 2024-08-05 DIAGNOSIS — J34.2 DEVIATED SEPTUM: ICD-10-CM

## 2024-08-05 DIAGNOSIS — Z13.220 SCREENING FOR HYPERLIPIDEMIA: ICD-10-CM

## 2024-08-05 DIAGNOSIS — B36.0 TINEA VERSICOLOR: ICD-10-CM

## 2024-08-05 DIAGNOSIS — Z00.00 ENCOUNTER FOR ANNUAL PHYSICAL EXAM: Primary | ICD-10-CM

## 2024-08-05 PROCEDURE — 99395 PREV VISIT EST AGE 18-39: CPT | Performed by: NURSE PRACTITIONER

## 2024-08-05 RX ORDER — FLUCONAZOLE 150 MG/1
300 TABLET ORAL WEEKLY
Qty: 4 TABLET | Refills: 0 | Status: SHIPPED | OUTPATIENT
Start: 2024-08-05 | End: 2024-08-19

## 2024-08-05 RX ORDER — KETOCONAZOLE 20 MG/ML
SHAMPOO TOPICAL 2 TIMES WEEKLY
Qty: 120 ML | Refills: 1 | Status: SHIPPED | OUTPATIENT
Start: 2024-08-05 | End: 2024-08-19

## 2024-08-05 NOTE — PROGRESS NOTES
"Chief Complaint  Annual Exam    Subjective        Tristian Velázquez presents to [facility]  History of Present Illness  Here for annual exam  Reports only new issue is rash on neck, torso. Hyperpigmented/ salmon areas. Itches on chest and back  Saw ent for deviated septum- was told he could have surgery- pt changed his mind for now- will wait- reports he does not feel like he has enough issues to want to get it done at this time   No excessive drinking, no tobacco or drug use   Declines std screening  Goes to eye doctor and dentist regularly  Exercises regularly- 4 days a week  Walks constantly at work       Objective   Vital Signs:  /78 (BP Location: Left arm, Patient Position: Sitting, Cuff Size: Adult)   Pulse 101   Temp 98.7 °F (37.1 °C) (Infrared)   Resp 20   Ht 172.7 cm (68\")   Wt 66.9 kg (147 lb 6.4 oz)   BMI 22.41 kg/m²   Estimated body mass index is 22.41 kg/m² as calculated from the following:    Height as of this encounter: 172.7 cm (68\").    Weight as of this encounter: 66.9 kg (147 lb 6.4 oz).       BMI is within normal parameters. No other follow-up for BMI required.      Physical Exam  Vitals and nursing note reviewed.   Constitutional:       General: He is not in acute distress.     Appearance: He is well-developed.   HENT:      Head: Normocephalic and atraumatic.      Right Ear: Tympanic membrane and ear canal normal.      Left Ear: Tympanic membrane and ear canal normal.      Nose: Nose normal. Septal deviation present.      Right Sinus: No maxillary sinus tenderness or frontal sinus tenderness.      Left Sinus: No maxillary sinus tenderness or frontal sinus tenderness.      Mouth/Throat:      Mouth: Mucous membranes are moist.      Pharynx: Oropharynx is clear. Uvula midline. No uvula swelling.   Eyes:      Conjunctiva/sclera: Conjunctivae normal.   Neck:      Thyroid: No thyromegaly.      Trachea: No tracheal deviation.   Cardiovascular:      Rate and " Rhythm: Normal rate and regular rhythm.      Heart sounds: Normal heart sounds.   Pulmonary:      Effort: Pulmonary effort is normal.      Breath sounds: Normal breath sounds.   Abdominal:      General: Bowel sounds are normal.      Palpations: Abdomen is soft. There is no mass.      Tenderness: There is no abdominal tenderness.   Musculoskeletal:      Cervical back: Normal range of motion and neck supple.   Lymphadenopathy:      Cervical: No cervical adenopathy.   Skin:     General: Skin is warm and dry.      Findings: Rash (chest and back, salmon colored rash) present.   Neurological:      General: No focal deficit present.      Mental Status: He is alert and oriented to person, place, and time.   Psychiatric:         Mood and Affect: Mood normal.         Behavior: Behavior normal.        Result Review :                     Assessment and Plan     Diagnoses and all orders for this visit:    1. Encounter for annual physical exam (Primary)  -     CBC & Differential  -     Lipid Panel  -     Comprehensive Metabolic Panel  -     Vitamin D,25-Hydroxy    2. Tinea versicolor  -     fluconazole (DIFLUCAN) 150 MG tablet; Take 2 tablets by mouth 1 (One) Time Per Week for 14 days.  Dispense: 4 tablet; Refill: 0  -     ketoconazole (NIZORAL) 2 % shampoo; Apply  topically to the appropriate area as directed 2 (Two) Times a Week for 14 days. Apply to affected area(s) of damp skin, lather, leave on 5 minutes, and rinse.  Dispense: 120 mL; Refill: 1    3. Screening for hyperlipidemia  -     Lipid Panel    4. Deviated septum      Plan:  Counseling/Anticipatory Guidance: encouraged healthy nutrition, physical activity, healthy weight, injury prevention, continue avoidance of misuse of tobacco, alcohol and drugs, discussed healthy sexual behavior and STDs, encouraged routine dental health, mental health visits, discussed recommended immunizations, screenings via .     Diflucan and ketoconazole course for tinea versicolor-  discussed may need extended treatment and to reach ou tot me if he does- we will continue- may have to recheck liver enzymes if extended fluconazole use    Fasting labs today          Follow Up     Return in about 1 year (around 8/5/2025) for Annual physical.  Patient was given instructions and counseling regarding his condition or for health maintenance advice. Please see specific information pulled into the AVS if appropriate.

## 2024-08-06 LAB
25(OH)D3+25(OH)D2 SERPL-MCNC: 18.2 NG/ML (ref 30–100)
ALBUMIN SERPL-MCNC: 5.1 G/DL (ref 3.5–5.2)
ALBUMIN/GLOB SERPL: 2.8 G/DL
ALP SERPL-CCNC: 127 U/L (ref 39–117)
ALT SERPL-CCNC: 27 U/L (ref 1–41)
AST SERPL-CCNC: 22 U/L (ref 1–40)
BASOPHILS # BLD AUTO: 0.09 10*3/MM3 (ref 0–0.2)
BASOPHILS NFR BLD AUTO: 1.1 % (ref 0–1.5)
BILIRUB SERPL-MCNC: 0.5 MG/DL (ref 0–1.2)
BUN SERPL-MCNC: 11 MG/DL (ref 6–20)
BUN/CREAT SERPL: 9.9 (ref 7–25)
CALCIUM SERPL-MCNC: 9.8 MG/DL (ref 8.6–10.5)
CHLORIDE SERPL-SCNC: 102 MMOL/L (ref 98–107)
CHOLEST SERPL-MCNC: 170 MG/DL (ref 0–200)
CO2 SERPL-SCNC: 26 MMOL/L (ref 22–29)
CREAT SERPL-MCNC: 1.11 MG/DL (ref 0.76–1.27)
EGFRCR SERPLBLD CKD-EPI 2021: 96.9 ML/MIN/1.73
EOSINOPHIL # BLD AUTO: 0.42 10*3/MM3 (ref 0–0.4)
EOSINOPHIL NFR BLD AUTO: 5.2 % (ref 0.3–6.2)
ERYTHROCYTE [DISTWIDTH] IN BLOOD BY AUTOMATED COUNT: 12.8 % (ref 12.3–15.4)
GLOBULIN SER CALC-MCNC: 1.8 GM/DL
GLUCOSE SERPL-MCNC: 89 MG/DL (ref 65–99)
HCT VFR BLD AUTO: 49.3 % (ref 37.5–51)
HDLC SERPL-MCNC: 34 MG/DL (ref 40–60)
HGB BLD-MCNC: 16.2 G/DL (ref 13–17.7)
IMM GRANULOCYTES # BLD AUTO: 0.02 10*3/MM3 (ref 0–0.05)
IMM GRANULOCYTES NFR BLD AUTO: 0.2 % (ref 0–0.5)
LDLC SERPL CALC-MCNC: 112 MG/DL (ref 0–100)
LYMPHOCYTES # BLD AUTO: 2.99 10*3/MM3 (ref 0.7–3.1)
LYMPHOCYTES NFR BLD AUTO: 37.2 % (ref 19.6–45.3)
MCH RBC QN AUTO: 29.7 PG (ref 26.6–33)
MCHC RBC AUTO-ENTMCNC: 32.9 G/DL (ref 31.5–35.7)
MCV RBC AUTO: 90.5 FL (ref 79–97)
MONOCYTES # BLD AUTO: 0.54 10*3/MM3 (ref 0.1–0.9)
MONOCYTES NFR BLD AUTO: 6.7 % (ref 5–12)
NEUTROPHILS # BLD AUTO: 3.97 10*3/MM3 (ref 1.7–7)
NEUTROPHILS NFR BLD AUTO: 49.6 % (ref 42.7–76)
NRBC BLD AUTO-RTO: 0 /100 WBC (ref 0–0.2)
PLATELET # BLD AUTO: 337 10*3/MM3 (ref 140–450)
POTASSIUM SERPL-SCNC: 4.4 MMOL/L (ref 3.5–5.2)
PROT SERPL-MCNC: 6.9 G/DL (ref 6–8.5)
RBC # BLD AUTO: 5.45 10*6/MM3 (ref 4.14–5.8)
SODIUM SERPL-SCNC: 137 MMOL/L (ref 136–145)
TRIGL SERPL-MCNC: 134 MG/DL (ref 0–150)
VLDLC SERPL CALC-MCNC: 24 MG/DL (ref 5–40)
WBC # BLD AUTO: 8.03 10*3/MM3 (ref 3.4–10.8)

## 2024-08-12 DIAGNOSIS — E55.9 VITAMIN D DEFICIENCY: Primary | ICD-10-CM

## 2024-11-26 ENCOUNTER — OFFICE VISIT (OUTPATIENT)
Dept: FAMILY MEDICINE CLINIC | Facility: CLINIC | Age: 24
End: 2024-11-26
Payer: COMMERCIAL

## 2024-11-26 VITALS
TEMPERATURE: 98 F | HEART RATE: 118 BPM | SYSTOLIC BLOOD PRESSURE: 124 MMHG | HEIGHT: 68 IN | DIASTOLIC BLOOD PRESSURE: 80 MMHG | OXYGEN SATURATION: 98 % | WEIGHT: 141 LBS | BODY MASS INDEX: 21.37 KG/M2

## 2024-11-26 DIAGNOSIS — R11.0 NAUSEA: ICD-10-CM

## 2024-11-26 DIAGNOSIS — K29.00 ACUTE GASTRITIS WITHOUT HEMORRHAGE, UNSPECIFIED GASTRITIS TYPE: Primary | ICD-10-CM

## 2024-11-26 PROCEDURE — 99213 OFFICE O/P EST LOW 20 MIN: CPT | Performed by: NURSE PRACTITIONER

## 2024-11-26 RX ORDER — OMEPRAZOLE 40 MG/1
40 CAPSULE, DELAYED RELEASE ORAL DAILY
Qty: 30 CAPSULE | Refills: 0 | Status: SHIPPED | OUTPATIENT
Start: 2024-11-26

## 2024-11-26 RX ORDER — ONDANSETRON 8 MG/1
8 TABLET, ORALLY DISINTEGRATING ORAL EVERY 8 HOURS PRN
Qty: 20 TABLET | Refills: 0 | Status: SHIPPED | OUTPATIENT
Start: 2024-11-26

## 2024-11-26 RX ORDER — AZITHROMYCIN 250 MG/1
TABLET, FILM COATED ORAL
Qty: 6 TABLET | Refills: 0 | Status: SHIPPED | OUTPATIENT
Start: 2024-11-26

## 2024-11-26 NOTE — PROGRESS NOTES
"Chief Complaint  Abdominal Pain (Pt here with c/o mid epigastric pain last 2 days and nausea since last Wednesday. Food and liquids worsen pain. )    Subjective        Tristian Velázquez presents to [facility]  History of Present Illness  Here for acute visit  Reports he has had abdominal pain for the last 5 days   Reports he thought it was a stomach virus however it has persisted  Now he only has abdominal pain and nausea  Upper abdominal pain  Food and liquid make worse         Objective   Vital Signs:  /80 (BP Location: Left arm, Patient Position: Sitting, Cuff Size: Adult)   Pulse 118   Temp 98 °F (36.7 °C) (Infrared)   Ht 172.7 cm (68\")   Wt 64 kg (141 lb)   SpO2 98%   BMI 21.44 kg/m²   Estimated body mass index is 21.44 kg/m² as calculated from the following:    Height as of this encounter: 172.7 cm (68\").    Weight as of this encounter: 64 kg (141 lb).       BMI is within normal parameters. No other follow-up for BMI required.      Physical Exam  Vitals and nursing note reviewed.   Constitutional:       General: He is not in acute distress.     Appearance: He is well-developed.   HENT:      Nose: Nose normal.   Eyes:      Conjunctiva/sclera: Conjunctivae normal.   Neck:      Thyroid: No thyromegaly.   Cardiovascular:      Rate and Rhythm: Normal rate and regular rhythm.      Heart sounds: Normal heart sounds.   Pulmonary:      Effort: Pulmonary effort is normal.      Breath sounds: Normal breath sounds.   Abdominal:      General: Bowel sounds are normal.      Palpations: Abdomen is soft. There is no mass.      Tenderness: There is abdominal tenderness in the epigastric area.   Musculoskeletal:      Cervical back: Neck supple.   Lymphadenopathy:      Cervical: No cervical adenopathy.   Skin:     General: Skin is warm and dry.   Neurological:      Mental Status: He is alert.   Psychiatric:         Mood and Affect: Mood normal.        Physical Exam      Result Review " :          Results             Assessment and Plan     Diagnoses and all orders for this visit:    1. Acute gastritis without hemorrhage, unspecified gastritis type (Primary)    2. Nausea    Other orders  -     azithromycin (ZITHROMAX) 250 MG tablet; Take 2 tablets the first day, then 1 tablet daily for 4 days.  Dispense: 6 tablet; Refill: 0  -     omeprazole (priLOSEC) 40 MG capsule; Take 1 capsule by mouth Daily.  Dispense: 30 capsule; Refill: 0  -     ondansetron ODT (ZOFRAN-ODT) 8 MG disintegrating tablet; Place 1 tablet on the tongue Every 8 (Eight) Hours As Needed for Nausea or Vomiting.  Dispense: 20 tablet; Refill: 0      Plan:  Will order course azithromycin in case food poisoning occurred, or bacterial infection  Prilosec ordered for daily use  Advised on bland diet, increase fluids  Zofran for nausea as needed       Assessment & Plan         Follow Up     Return in about 1 week (around 12/3/2024), or if symptoms worsen or fail to improve.  Patient was given instructions and counseling regarding his condition or for health maintenance advice. Please see specific information pulled into the AVS if appropriate.       Patient or patient representative verbalized consent for the use of Ambient Listening during the visit with  HARDIK Diaz for chart documentation. 12/10/2024  13:39 CST

## 2024-11-26 NOTE — LETTER
December 2, 2024     Patient: Tristian Velázquez   YOB: 2000   Date of Visit: 11/26/2024       To Whom It May Concern:    It is my medical opinion that Tristian Velázquez may return to work in two days. Excuse 11/20/24,11/25/24 and 11/27/24.          Sincerely,        HARDIK Diaz    CC: No Recipients

## 2024-12-26 ENCOUNTER — OFFICE VISIT (OUTPATIENT)
Dept: FAMILY MEDICINE CLINIC | Facility: CLINIC | Age: 24
End: 2024-12-26
Payer: COMMERCIAL

## 2024-12-26 VITALS
SYSTOLIC BLOOD PRESSURE: 132 MMHG | HEART RATE: 107 BPM | OXYGEN SATURATION: 100 % | DIASTOLIC BLOOD PRESSURE: 85 MMHG | WEIGHT: 138 LBS | RESPIRATION RATE: 20 BRPM | HEIGHT: 68 IN | BODY MASS INDEX: 20.92 KG/M2 | TEMPERATURE: 98 F

## 2024-12-26 DIAGNOSIS — K27.9 PUD (PEPTIC ULCER DISEASE): ICD-10-CM

## 2024-12-26 DIAGNOSIS — E55.9 VITAMIN D DEFICIENCY: ICD-10-CM

## 2024-12-26 DIAGNOSIS — R11.0 NAUSEA: ICD-10-CM

## 2024-12-26 DIAGNOSIS — K21.9 GASTROESOPHAGEAL REFLUX DISEASE WITHOUT ESOPHAGITIS: Primary | ICD-10-CM

## 2024-12-26 PROCEDURE — 99213 OFFICE O/P EST LOW 20 MIN: CPT | Performed by: NURSE PRACTITIONER

## 2024-12-26 RX ORDER — OMEPRAZOLE 40 MG/1
40 CAPSULE, DELAYED RELEASE ORAL DAILY
Qty: 90 CAPSULE | Refills: 2 | Status: SHIPPED | OUTPATIENT
Start: 2024-12-26

## 2024-12-26 NOTE — PROGRESS NOTES
Answers submitted by the patient for this visit:  Primary Reason for Visit (Submitted on 12/19/2024)  What is the primary reason for your visit?: Abdominal Pain  Abdominal Pain Questionnaire (Submitted on 12/19/2024)  Chief Complaint: Abdominal pain  Chronicity: new  Onset: 1 to 4 weeks ago  Onset quality: gradual  Frequency: 2 to 4 times per day  Episode duration: 6 Hours  Progression since onset: coming and going  Pain location: epigastric region  Pain - numeric: 3/10  Pain quality: burning, a sensation of fullness  Radiates to: LUQ, epigastric region  anorexia: Yes  arthralgias: No  belching: No  constipation: Yes  diarrhea: No  dysuria: No  fever: No  flatus: No  frequency: No  hematochezia: No  hematuria: No  melena: No  myalgias: No  nausea: Yes  vomiting: No  Aggravated by: certain positions, eating, movement  Relieved by: certain positions, eating, standing  Chief Complaint  Nausea (Patient reports that he has nausea. )    Subjective        Tristian Velázquez presents to [facility]  History of Present Illness  The patient is a 24-year-old male who presents today for abdominal pain.    He reports persistent upper abdominal discomfort, which initially improved with the administration of omeprazole. However, upon discontinuation of the medication, he experienced a significant exacerbation of symptoms approximately 2 weeks ago. He resumed the omeprazole regimen, resulting in a near-return to baseline. He also reports occasional nausea, which has been severe enough to necessitate early departure from the gym. He has not been supplementing with vitamin D for the past month. He does not endorse any abnormal bowel movements, including the presence of blood or mucus in the stool, but does report constipation, which he attributes to the initiation of omeprazole therapy. He has identified coffee as a trigger for his symptoms.    MEDICATIONS  Current: omeprazole    Objective   Vital  "Signs:  /85 (BP Location: Left arm, Patient Position: Sitting, Cuff Size: Adult)   Pulse 107   Temp 98 °F (36.7 °C) (Infrared)   Resp 20   Ht 172.7 cm (68\")   Wt 62.6 kg (138 lb)   SpO2 100%   BMI 20.98 kg/m²   Estimated body mass index is 20.98 kg/m² as calculated from the following:    Height as of this encounter: 172.7 cm (68\").    Weight as of this encounter: 62.6 kg (138 lb).       BMI is within normal parameters. No other follow-up for BMI required.      Physical Exam  Vitals and nursing note reviewed.   Constitutional:       Appearance: He is well-developed.   HENT:      Head: Normocephalic and atraumatic.   Eyes:      Conjunctiva/sclera: Conjunctivae normal.   Cardiovascular:      Rate and Rhythm: Normal rate and regular rhythm.   Pulmonary:      Effort: Pulmonary effort is normal.   Musculoskeletal:      Cervical back: Normal range of motion.   Neurological:      General: No focal deficit present.      Mental Status: He is alert and oriented to person, place, and time.   Psychiatric:         Mood and Affect: Mood normal.         Behavior: Behavior normal.        Physical Exam      Result Review :          Results             Assessment and Plan     Diagnoses and all orders for this visit:    1. Gastroesophageal reflux disease without esophagitis (Primary)  -     omeprazole (priLOSEC) 40 MG capsule; Take 1 capsule by mouth Daily.  Dispense: 90 capsule; Refill: 2    2. Nausea  -     CBC & Differential    3. PUD (peptic ulcer disease)    4. Vitamin D deficiency      Assessment & Plan  1. Gerd/pud  He reports that his upper stomach pain improved with omeprazole but worsened when he stopped taking it. He restarted the medication two weeks ago and is now almost back to normal. He also experiences nausea, particularly during physical activity, which may be due to anemia or reflux exacerbated by exercise. He has not taken vitamin D in a month. He will continue omeprazole for 6 months, followed by a " gradual tapering off period. A prescription for omeprazole will be sent to [facility]. Laboratory tests will be conducted today to assess his blood count and vitamin D levels. He is advised to avoid foods that aggravate his condition, such as coffee. If he experiences constipation while on omeprazole, a stool softener may be considered. If his abdominal discomfort persists despite being on omeprazole, an alternative medication may be considered. If there is no improvement, a referral to a gastroenterologist will be made.       Follow Up     Return for Next scheduled follow up.  Patient was given instructions and counseling regarding his condition or for health maintenance advice. Please see specific information pulled into the AVS if appropriate.       Patient or patient representative verbalized consent for the use of Ambient Listening during the visit with  HARDIK Diaz for chart documentation. 12/26/2024  07:46 CST

## 2024-12-27 LAB
25(OH)D3+25(OH)D2 SERPL-MCNC: 22.2 NG/ML (ref 30–100)
BASOPHILS # BLD AUTO: 0.09 10*3/MM3 (ref 0–0.2)
BASOPHILS NFR BLD AUTO: 1.2 % (ref 0–1.5)
EOSINOPHIL # BLD AUTO: 0.14 10*3/MM3 (ref 0–0.4)
EOSINOPHIL NFR BLD AUTO: 1.9 % (ref 0.3–6.2)
ERYTHROCYTE [DISTWIDTH] IN BLOOD BY AUTOMATED COUNT: 12.8 % (ref 12.3–15.4)
HCT VFR BLD AUTO: 52.4 % (ref 37.5–51)
HGB BLD-MCNC: 16.4 G/DL (ref 13–17.7)
IMM GRANULOCYTES # BLD AUTO: 0.01 10*3/MM3 (ref 0–0.05)
IMM GRANULOCYTES NFR BLD AUTO: 0.1 % (ref 0–0.5)
LYMPHOCYTES # BLD AUTO: 3.13 10*3/MM3 (ref 0.7–3.1)
LYMPHOCYTES NFR BLD AUTO: 42.5 % (ref 19.6–45.3)
MCH RBC QN AUTO: 28.6 PG (ref 26.6–33)
MCHC RBC AUTO-ENTMCNC: 31.3 G/DL (ref 31.5–35.7)
MCV RBC AUTO: 91.4 FL (ref 79–97)
MONOCYTES # BLD AUTO: 0.58 10*3/MM3 (ref 0.1–0.9)
MONOCYTES NFR BLD AUTO: 7.9 % (ref 5–12)
NEUTROPHILS # BLD AUTO: 3.41 10*3/MM3 (ref 1.7–7)
NEUTROPHILS NFR BLD AUTO: 46.4 % (ref 42.7–76)
NRBC BLD AUTO-RTO: 0 /100 WBC (ref 0–0.2)
PLATELET # BLD AUTO: 353 10*3/MM3 (ref 140–450)
RBC # BLD AUTO: 5.73 10*6/MM3 (ref 4.14–5.8)
WBC # BLD AUTO: 7.36 10*3/MM3 (ref 3.4–10.8)

## 2025-01-01 RX ORDER — ERGOCALCIFEROL 1.25 MG/1
50000 CAPSULE, LIQUID FILLED ORAL WEEKLY
Qty: 12 CAPSULE | Refills: 1 | Status: SHIPPED | OUTPATIENT
Start: 2025-01-01

## 2025-02-03 DIAGNOSIS — R10.13 EPIGASTRIC PAIN: Primary | ICD-10-CM

## 2025-02-03 RX ORDER — PANTOPRAZOLE SODIUM 40 MG/1
40 TABLET, DELAYED RELEASE ORAL DAILY
Qty: 30 TABLET | Refills: 0 | Status: SHIPPED | OUTPATIENT
Start: 2025-02-03

## 2025-03-11 ENCOUNTER — OFFICE VISIT (OUTPATIENT)
Dept: GASTROENTEROLOGY | Facility: CLINIC | Age: 25
End: 2025-03-11
Payer: COMMERCIAL

## 2025-03-11 VITALS
TEMPERATURE: 97.7 F | BODY MASS INDEX: 19.67 KG/M2 | WEIGHT: 129.8 LBS | DIASTOLIC BLOOD PRESSURE: 80 MMHG | OXYGEN SATURATION: 99 % | SYSTOLIC BLOOD PRESSURE: 128 MMHG | HEART RATE: 109 BPM | HEIGHT: 68 IN

## 2025-03-11 DIAGNOSIS — R11.0 NAUSEA: Primary | ICD-10-CM

## 2025-03-11 DIAGNOSIS — R10.13 ABDOMINAL PAIN, EPIGASTRIC: ICD-10-CM

## 2025-03-11 DIAGNOSIS — R63.4 WEIGHT LOSS, ABNORMAL: ICD-10-CM

## 2025-03-11 RX ORDER — OMEPRAZOLE 40 MG/1
40 CAPSULE, DELAYED RELEASE ORAL DAILY
COMMUNITY

## 2025-03-11 NOTE — PROGRESS NOTES
Tristian Velázquez  2000    3/11/2025  Chief Complaint   Patient presents with    GI Problem     Epigastric pain, persistent nausea     Subjective   HPI  Tristian Velázquez is a 24 y.o. male who presents with a complaint persistent nausea since November. It is daily. It is moderate. Pain to the epigastric is a cramp and comes and goes. He rates his pain a 2 out of 10. No certain triggers. No alleviating factors. The nausea will begin after meals approx 20-30 minutes to hours. He says he has a loss in appetite. He has lost 10 lbs since November.    He has a hx of pyloric stenosis he says diagnosed as a toddler I do not find this in his chart and he says he did not go to a tertiary care center for this he thinks around the age of 7 or 8. He did not have any type of intervention.  He denies any acid reflux. No indigestion. No melena. No brbpr. He does not take NSAIDS.   He is on Prilosec daily and he was on Protonix and this did help him.     Past Medical History:   Diagnosis Date    Anxiety 03/31/2021     Past Surgical History:   Procedure Laterality Date    TONSILLECTOMY      WART REMOVAL         Outpatient Medications Marked as Taking for the 3/11/25 encounter (Office Visit) with Ro Alvarez APRN   Medication Sig Dispense Refill    omeprazole (priLOSEC) 40 MG capsule Take 1 capsule by mouth Daily.      vitamin D (ERGOCALCIFEROL) 1.25 MG (56435 UT) capsule capsule Take 1 capsule by mouth 1 (One) Time Per Week. 12 capsule 1     No Known Allergies  Social History     Socioeconomic History    Marital status: Single   Tobacco Use    Smoking status: Never    Smokeless tobacco: Never   Vaping Use    Vaping status: Never Used   Substance and Sexual Activity    Alcohol use: Yes     Alcohol/week: 3.0 standard drinks of alcohol     Types: 1 Glasses of wine, 1 Cans of beer, 1 Shots of liquor per week    Drug use: Never    Sexual activity: Never     Family History   Problem Relation Age of Onset    Hyperlipidemia  Father     Anxiety disorder Father     Depression Father     Mental illness Father     Colon cancer Neg Hx     Colon polyps Neg Hx      Health Maintenance   Topic Date Due    MENINGOCOCCAL B VACCINE (1 of 2 - Standard) Never done    TDAP/TD VACCINES (1 - Tdap) Never done    COVID-19 Vaccine (1 - 2024-25 season) Never done    ANNUAL PHYSICAL  08/05/2025    INFLUENZA VACCINE  Completed    Pneumococcal Vaccine 0-49  Aged Out    MENINGOCOCCAL VACCINE  Aged Out    HEPATITIS C SCREENING  Discontinued     Review of Systems   Constitutional:  Positive for unexpected weight change. Negative for activity change, appetite change, chills, diaphoresis, fatigue and fever.   HENT:  Negative for ear pain, hearing loss, mouth sores, sore throat, trouble swallowing and voice change.    Eyes: Negative.    Respiratory:  Negative for cough, choking, shortness of breath and wheezing.    Cardiovascular:  Negative for chest pain and palpitations.   Gastrointestinal:  Positive for abdominal pain and nausea. Negative for blood in stool, constipation, diarrhea and vomiting.   Endocrine: Negative for cold intolerance and heat intolerance.   Genitourinary:  Negative for decreased urine volume, dysuria, frequency, hematuria and urgency.   Musculoskeletal:  Negative for back pain, gait problem and myalgias.   Skin:  Negative for color change, pallor and rash.   Allergic/Immunologic: Negative for food allergies and immunocompromised state.   Neurological:  Negative for dizziness, tremors, seizures, syncope, weakness, light-headedness, numbness and headaches.   Hematological:  Negative for adenopathy. Does not bruise/bleed easily.   Psychiatric/Behavioral:  Negative for agitation and confusion. The patient is not nervous/anxious.    All other systems reviewed and are negative.    Objective   Vitals:    03/11/25 0919   BP: 128/80   BP Location: Left arm   Pulse: 109   Temp: 97.7 °F (36.5 °C)   TempSrc: Temporal   SpO2: 99%   Weight: 58.9 kg (129 lb  "12.8 oz)   Height: 172.7 cm (67.99\")     Body mass index is 19.74 kg/m².  Physical Exam  Constitutional:       Appearance: He is well-developed.   HENT:      Head: Normocephalic and atraumatic.   Eyes:      Pupils: Pupils are equal, round, and reactive to light.   Neck:      Trachea: No tracheal deviation.   Cardiovascular:      Rate and Rhythm: Normal rate and regular rhythm.      Heart sounds: Normal heart sounds. No murmur heard.     No friction rub. No gallop.   Pulmonary:      Effort: Pulmonary effort is normal. No respiratory distress.      Breath sounds: Normal breath sounds. No wheezing or rales.   Chest:      Chest wall: No tenderness.   Abdominal:      General: Bowel sounds are normal. There is no distension.      Palpations: Abdomen is soft. Abdomen is not rigid.      Tenderness: There is no abdominal tenderness (epigastric tenderness). There is no guarding or rebound.   Musculoskeletal:         General: No tenderness or deformity. Normal range of motion.      Cervical back: Normal range of motion and neck supple.   Skin:     General: Skin is warm and dry.      Coloration: Skin is not pale.      Findings: No rash.   Neurological:      Mental Status: He is alert and oriented to person, place, and time.      Deep Tendon Reflexes: Reflexes are normal and symmetric.   Psychiatric:         Behavior: Behavior normal.         Thought Content: Thought content normal.         Judgment: Judgment normal.       Assessment & Plan   Diagnoses and all orders for this visit:    1. Nausea (Primary)  -     Case Request; Standing  -     Case Request    2. Abdominal pain, epigastric    3. Weight loss, abnormal  Comments:  10 lbs since November    Other orders  -     Implement Anesthesia Orders Day of Procedure; Standing  -     Follow Anesthesia Guidelines / Protocol; Future    I discussed non pharmaceutical treatment of gerd.  This includes gradually losing weight to achieve ideal body wt., elevation of the head of bed by " 4-6 inches, nothing to eat or drink 3 hours prior to lying down, avoiding tight clothing, stress reduction, tobacco cessation, reduction of alcohol intake, and dietary restrictions (avoiding caffeine, coffee, fatty foods, mints, chocolate, spicy foods and tomato based sauces as much as possible).  Continue Prilosec for now  Will get endoscopy to further evaluate.     ESOPHAGOGASTRODUODENOSCOPY WITH ANESTHESIA (N/A)  Part of this note may be an electronic transcription/translation of spoken language to printed text using the Dragon Dictation System.  Body mass index is 19.74 kg/m².  No follow-ups on file.    BMI is within normal parameters. No other follow-up for BMI required.      All risks, benefits, alternatives, and indications of colonoscopy and/or Endoscopy procedure have been discussed with the patient. Risks to include perforation of the colon requiring possible surgery or colostomy, risk of bleeding from biopsies or removal of colon tissue, possibility of missing a colon polyp or cancer, or adverse drug reaction.  Benefits to include the diagnosis and management of disease of the colon and rectum. Alternatives to include barium enema, radiographic evaluation, lab testing or no intervention. Pt verbalizes understanding and agrees.     Ro Alvarez, APRN  3/11/2025  10:01 CDT          If you smoke or use tobacco, 4 minutes reading provided  Steps to Quit Smoking  Smoking tobacco can be harmful to your health and can affect almost every organ in your body. Smoking puts you, and those around you, at risk for developing many serious chronic diseases. Quitting smoking is difficult, but it is one of the best things that you can do for your health. It is never too late to quit.  What are the benefits of quitting smoking?  When you quit smoking, you lower your risk of developing serious diseases and conditions, such as:  Lung cancer or lung disease, such as COPD.  Heart disease.  Stroke.  Heart  attack.  Infertility.  Osteoporosis and bone fractures.  Additionally, symptoms such as coughing, wheezing, and shortness of breath may get better when you quit. You may also find that you get sick less often because your body is stronger at fighting off colds and infections. If you are pregnant, quitting smoking can help to reduce your chances of having a baby of low birth weight.  How do I get ready to quit?  When you decide to quit smoking, create a plan to make sure that you are successful. Before you quit:  Pick a date to quit. Set a date within the next two weeks to give you time to prepare.  Write down the reasons why you are quitting. Keep this list in places where you will see it often, such as on your bathroom mirror or in your car or wallet.  Identify the people, places, things, and activities that make you want to smoke (triggers) and avoid them. Make sure to take these actions:  Throw away all cigarettes at home, at work, and in your car.  Throw away smoking accessories, such as ashtrays and lighters.  Clean your car and make sure to empty the ashtray.  Clean your home, including curtains and carpets.  Tell your family, friends, and coworkers that you are quitting. Support from your loved ones can make quitting easier.  Talk with your health care provider about your options for quitting smoking.  Find out what treatment options are covered by your health insurance.  What strategies can I use to quit smoking?  Talk with your healthcare provider about different strategies to quit smoking. Some strategies include:  Quitting smoking altogether instead of gradually lessening how much you smoke over a period of time. Research shows that quitting “cold turkey” is more successful than gradually quitting.  Attending in-person counseling to help you build problem-solving skills. You are more likely to have success in quitting if you attend several counseling sessions. Even short sessions of 10 minutes can be  effective.  Finding resources and support systems that can help you to quit smoking and remain smoke-free after you quit. These resources are most helpful when you use them often. They can include:  Online chats with a counselor.  Telephone quitlines.  Printed self-help materials.  Support groups or group counseling.  Text messaging programs.  Mobile phone applications.  Taking medicines to help you quit smoking. (If you are pregnant or breastfeeding, talk with your health care provider first.) Some medicines contain nicotine and some do not. Both types of medicines help with cravings, but the medicines that include nicotine help to relieve withdrawal symptoms. Your health care provider may recommend:  Nicotine patches, gum, or lozenges.  Nicotine inhalers or sprays.  Non-nicotine medicine that is taken by mouth.  Talk with your health care provider about combining strategies, such as taking medicines while you are also receiving in-person counseling. Using these two strategies together makes you more likely to succeed in quitting than if you used either strategy on its own.  If you are pregnant or breastfeeding, talk with your health care provider about finding counseling or other support strategies to quit smoking. Do not take medicine to help you quit smoking unless told to do so by your health care provider.  What things can I do to make it easier to quit?  Quitting smoking might feel overwhelming at first, but there is a lot that you can do to make it easier. Take these important actions:  Reach out to your family and friends and ask that they support and encourage you during this time. Call telephone quitlines, reach out to support groups, or work with a counselor for support.  Ask people who smoke to avoid smoking around you.  Avoid places that trigger you to smoke, such as bars, parties, or smoke-break areas at work.  Spend time around people who do not smoke.  Lessen stress in your life, because stress can  be a smoking trigger for some people. To lessen stress, try:  Exercising regularly.  Deep-breathing exercises.  Yoga.  Meditating.  Performing a body scan. This involves closing your eyes, scanning your body from head to toe, and noticing which parts of your body are particularly tense. Purposefully relax the muscles in those areas.  Download or purchase mobile phone or tablet apps (applications) that can help you stick to your quit plan by providing reminders, tips, and encouragement. There are many free apps, such as QuitGuide from the CDC (Centers for Disease Control and Prevention). You can find other support for quitting smoking (smoking cessation) through smokefree.gov and other websites.  How will I feel when I quit smoking?  Within the first 24 hours of quitting smoking, you may start to feel some withdrawal symptoms. These symptoms are usually most noticeable 2-3 days after quitting, but they usually do not last beyond 2-3 weeks. Changes or symptoms that you might experience include:  Mood swings.  Restlessness, anxiety, or irritation.  Difficulty concentrating.  Dizziness.  Strong cravings for sugary foods in addition to nicotine.  Mild weight gain.  Constipation.  Nausea.  Coughing or a sore throat.  Changes in how your medicines work in your body.  A depressed mood.  Difficulty sleeping (insomnia).  After the first 2-3 weeks of quitting, you may start to notice more positive results, such as:  Improved sense of smell and taste.  Decreased coughing and sore throat.  Slower heart rate.  Lower blood pressure.  Clearer skin.  The ability to breathe more easily.  Fewer sick days.  Quitting smoking is very challenging for most people. Do not get discouraged if you are not successful the first time. Some people need to make many attempts to quit before they achieve long-term success. Do your best to stick to your quit plan, and talk with your health care provider if you have any questions or concerns.  This  information is not intended to replace advice given to you by your health care provider. Make sure you discuss any questions you have with your health care provider.  Document Released: 2002 Document Revised: 08/15/2017 Document Reviewed: 05/03/2016  [employer] Interactive Patient Education © 2017 [employer]

## 2025-03-11 NOTE — H&P (VIEW-ONLY)
Tristian Velázquez  2000    3/11/2025  Chief Complaint   Patient presents with    GI Problem     Epigastric pain, persistent nausea     Subjective   HPI  Tristian Velázquez is a 24 y.o. male who presents with a complaint persistent nausea since November. It is daily. It is moderate. Pain to the epigastric is a cramp and comes and goes. He rates his pain a 2 out of 10. No certain triggers. No alleviating factors. The nausea will begin after meals approx 20-30 minutes to hours. He says he has a loss in appetite. He has lost 10 lbs since November.    He has a hx of pyloric stenosis he says diagnosed as a toddler I do not find this in his chart and he says he did not go to a tertiary care center for this he thinks around the age of 7 or 8. He did not have any type of intervention.  He denies any acid reflux. No indigestion. No melena. No brbpr. He does not take NSAIDS.   He is on Prilosec daily and he was on Protonix and this did help him.     Past Medical History:   Diagnosis Date    Anxiety 03/31/2021     Past Surgical History:   Procedure Laterality Date    TONSILLECTOMY      WART REMOVAL         Outpatient Medications Marked as Taking for the 3/11/25 encounter (Office Visit) with Ro Alvarez APRN   Medication Sig Dispense Refill    omeprazole (priLOSEC) 40 MG capsule Take 1 capsule by mouth Daily.      vitamin D (ERGOCALCIFEROL) 1.25 MG (16419 UT) capsule capsule Take 1 capsule by mouth 1 (One) Time Per Week. 12 capsule 1     No Known Allergies  Social History     Socioeconomic History    Marital status: Single   Tobacco Use    Smoking status: Never    Smokeless tobacco: Never   Vaping Use    Vaping status: Never Used   Substance and Sexual Activity    Alcohol use: Yes     Alcohol/week: 3.0 standard drinks of alcohol     Types: 1 Glasses of wine, 1 Cans of beer, 1 Shots of liquor per week    Drug use: Never    Sexual activity: Never     Family History   Problem Relation Age of Onset    Hyperlipidemia  Father     Anxiety disorder Father     Depression Father     Mental illness Father     Colon cancer Neg Hx     Colon polyps Neg Hx      Health Maintenance   Topic Date Due    MENINGOCOCCAL B VACCINE (1 of 2 - Standard) Never done    TDAP/TD VACCINES (1 - Tdap) Never done    COVID-19 Vaccine (1 - 2024-25 season) Never done    ANNUAL PHYSICAL  08/05/2025    INFLUENZA VACCINE  Completed    Pneumococcal Vaccine 0-49  Aged Out    MENINGOCOCCAL VACCINE  Aged Out    HEPATITIS C SCREENING  Discontinued     Review of Systems   Constitutional:  Positive for unexpected weight change. Negative for activity change, appetite change, chills, diaphoresis, fatigue and fever.   HENT:  Negative for ear pain, hearing loss, mouth sores, sore throat, trouble swallowing and voice change.    Eyes: Negative.    Respiratory:  Negative for cough, choking, shortness of breath and wheezing.    Cardiovascular:  Negative for chest pain and palpitations.   Gastrointestinal:  Positive for abdominal pain and nausea. Negative for blood in stool, constipation, diarrhea and vomiting.   Endocrine: Negative for cold intolerance and heat intolerance.   Genitourinary:  Negative for decreased urine volume, dysuria, frequency, hematuria and urgency.   Musculoskeletal:  Negative for back pain, gait problem and myalgias.   Skin:  Negative for color change, pallor and rash.   Allergic/Immunologic: Negative for food allergies and immunocompromised state.   Neurological:  Negative for dizziness, tremors, seizures, syncope, weakness, light-headedness, numbness and headaches.   Hematological:  Negative for adenopathy. Does not bruise/bleed easily.   Psychiatric/Behavioral:  Negative for agitation and confusion. The patient is not nervous/anxious.    All other systems reviewed and are negative.    Objective   Vitals:    03/11/25 0919   BP: 128/80   BP Location: Left arm   Pulse: 109   Temp: 97.7 °F (36.5 °C)   TempSrc: Temporal   SpO2: 99%   Weight: 58.9 kg (129 lb  "12.8 oz)   Height: 172.7 cm (67.99\")     Body mass index is 19.74 kg/m².  Physical Exam  Constitutional:       Appearance: He is well-developed.   HENT:      Head: Normocephalic and atraumatic.   Eyes:      Pupils: Pupils are equal, round, and reactive to light.   Neck:      Trachea: No tracheal deviation.   Cardiovascular:      Rate and Rhythm: Normal rate and regular rhythm.      Heart sounds: Normal heart sounds. No murmur heard.     No friction rub. No gallop.   Pulmonary:      Effort: Pulmonary effort is normal. No respiratory distress.      Breath sounds: Normal breath sounds. No wheezing or rales.   Chest:      Chest wall: No tenderness.   Abdominal:      General: Bowel sounds are normal. There is no distension.      Palpations: Abdomen is soft. Abdomen is not rigid.      Tenderness: There is no abdominal tenderness (epigastric tenderness). There is no guarding or rebound.   Musculoskeletal:         General: No tenderness or deformity. Normal range of motion.      Cervical back: Normal range of motion and neck supple.   Skin:     General: Skin is warm and dry.      Coloration: Skin is not pale.      Findings: No rash.   Neurological:      Mental Status: He is alert and oriented to person, place, and time.      Deep Tendon Reflexes: Reflexes are normal and symmetric.   Psychiatric:         Behavior: Behavior normal.         Thought Content: Thought content normal.         Judgment: Judgment normal.       Assessment & Plan   Diagnoses and all orders for this visit:    1. Nausea (Primary)  -     Case Request; Standing  -     Case Request    2. Abdominal pain, epigastric    3. Weight loss, abnormal  Comments:  10 lbs since November    Other orders  -     Implement Anesthesia Orders Day of Procedure; Standing  -     Follow Anesthesia Guidelines / Protocol; Future    I discussed non pharmaceutical treatment of gerd.  This includes gradually losing weight to achieve ideal body wt., elevation of the head of bed by " 4-6 inches, nothing to eat or drink 3 hours prior to lying down, avoiding tight clothing, stress reduction, tobacco cessation, reduction of alcohol intake, and dietary restrictions (avoiding caffeine, coffee, fatty foods, mints, chocolate, spicy foods and tomato based sauces as much as possible).  Continue Prilosec for now  Will get endoscopy to further evaluate.     ESOPHAGOGASTRODUODENOSCOPY WITH ANESTHESIA (N/A)  Part of this note may be an electronic transcription/translation of spoken language to printed text using the Dragon Dictation System.  Body mass index is 19.74 kg/m².  No follow-ups on file.    BMI is within normal parameters. No other follow-up for BMI required.      All risks, benefits, alternatives, and indications of colonoscopy and/or Endoscopy procedure have been discussed with the patient. Risks to include perforation of the colon requiring possible surgery or colostomy, risk of bleeding from biopsies or removal of colon tissue, possibility of missing a colon polyp or cancer, or adverse drug reaction.  Benefits to include the diagnosis and management of disease of the colon and rectum. Alternatives to include barium enema, radiographic evaluation, lab testing or no intervention. Pt verbalizes understanding and agrees.     Ro Alvarez, APRN  3/11/2025  10:01 CDT          If you smoke or use tobacco, 4 minutes reading provided  Steps to Quit Smoking  Smoking tobacco can be harmful to your health and can affect almost every organ in your body. Smoking puts you, and those around you, at risk for developing many serious chronic diseases. Quitting smoking is difficult, but it is one of the best things that you can do for your health. It is never too late to quit.  What are the benefits of quitting smoking?  When you quit smoking, you lower your risk of developing serious diseases and conditions, such as:  Lung cancer or lung disease, such as COPD.  Heart disease.  Stroke.  Heart  attack.  Infertility.  Osteoporosis and bone fractures.  Additionally, symptoms such as coughing, wheezing, and shortness of breath may get better when you quit. You may also find that you get sick less often because your body is stronger at fighting off colds and infections. If you are pregnant, quitting smoking can help to reduce your chances of having a baby of low birth weight.  How do I get ready to quit?  When you decide to quit smoking, create a plan to make sure that you are successful. Before you quit:  Pick a date to quit. Set a date within the next two weeks to give you time to prepare.  Write down the reasons why you are quitting. Keep this list in places where you will see it often, such as on your bathroom mirror or in your car or wallet.  Identify the people, places, things, and activities that make you want to smoke (triggers) and avoid them. Make sure to take these actions:  Throw away all cigarettes at home, at work, and in your car.  Throw away smoking accessories, such as ashtrays and lighters.  Clean your car and make sure to empty the ashtray.  Clean your home, including curtains and carpets.  Tell your family, friends, and coworkers that you are quitting. Support from your loved ones can make quitting easier.  Talk with your health care provider about your options for quitting smoking.  Find out what treatment options are covered by your health insurance.  What strategies can I use to quit smoking?  Talk with your healthcare provider about different strategies to quit smoking. Some strategies include:  Quitting smoking altogether instead of gradually lessening how much you smoke over a period of time. Research shows that quitting “cold turkey” is more successful than gradually quitting.  Attending in-person counseling to help you build problem-solving skills. You are more likely to have success in quitting if you attend several counseling sessions. Even short sessions of 10 minutes can be  effective.  Finding resources and support systems that can help you to quit smoking and remain smoke-free after you quit. These resources are most helpful when you use them often. They can include:  Online chats with a counselor.  Telephone quitlines.  Printed self-help materials.  Support groups or group counseling.  Text messaging programs.  Mobile phone applications.  Taking medicines to help you quit smoking. (If you are pregnant or breastfeeding, talk with your health care provider first.) Some medicines contain nicotine and some do not. Both types of medicines help with cravings, but the medicines that include nicotine help to relieve withdrawal symptoms. Your health care provider may recommend:  Nicotine patches, gum, or lozenges.  Nicotine inhalers or sprays.  Non-nicotine medicine that is taken by mouth.  Talk with your health care provider about combining strategies, such as taking medicines while you are also receiving in-person counseling. Using these two strategies together makes you more likely to succeed in quitting than if you used either strategy on its own.  If you are pregnant or breastfeeding, talk with your health care provider about finding counseling or other support strategies to quit smoking. Do not take medicine to help you quit smoking unless told to do so by your health care provider.  What things can I do to make it easier to quit?  Quitting smoking might feel overwhelming at first, but there is a lot that you can do to make it easier. Take these important actions:  Reach out to your family and friends and ask that they support and encourage you during this time. Call telephone quitlines, reach out to support groups, or work with a counselor for support.  Ask people who smoke to avoid smoking around you.  Avoid places that trigger you to smoke, such as bars, parties, or smoke-break areas at work.  Spend time around people who do not smoke.  Lessen stress in your life, because stress can  be a smoking trigger for some people. To lessen stress, try:  Exercising regularly.  Deep-breathing exercises.  Yoga.  Meditating.  Performing a body scan. This involves closing your eyes, scanning your body from head to toe, and noticing which parts of your body are particularly tense. Purposefully relax the muscles in those areas.  Download or purchase mobile phone or tablet apps (applications) that can help you stick to your quit plan by providing reminders, tips, and encouragement. There are many free apps, such as QuitGuide from the CDC (Centers for Disease Control and Prevention). You can find other support for quitting smoking (smoking cessation) through smokefree.gov and other websites.  How will I feel when I quit smoking?  Within the first 24 hours of quitting smoking, you may start to feel some withdrawal symptoms. These symptoms are usually most noticeable 2-3 days after quitting, but they usually do not last beyond 2-3 weeks. Changes or symptoms that you might experience include:  Mood swings.  Restlessness, anxiety, or irritation.  Difficulty concentrating.  Dizziness.  Strong cravings for sugary foods in addition to nicotine.  Mild weight gain.  Constipation.  Nausea.  Coughing or a sore throat.  Changes in how your medicines work in your body.  A depressed mood.  Difficulty sleeping (insomnia).  After the first 2-3 weeks of quitting, you may start to notice more positive results, such as:  Improved sense of smell and taste.  Decreased coughing and sore throat.  Slower heart rate.  Lower blood pressure.  Clearer skin.  The ability to breathe more easily.  Fewer sick days.  Quitting smoking is very challenging for most people. Do not get discouraged if you are not successful the first time. Some people need to make many attempts to quit before they achieve long-term success. Do your best to stick to your quit plan, and talk with your health care provider if you have any questions or concerns.  This  information is not intended to replace advice given to you by your health care provider. Make sure you discuss any questions you have with your health care provider.  Document Released: 2002 Document Revised: 08/15/2017 Document Reviewed: 05/03/2016  [employer] Interactive Patient Education © 2017 [employer]

## 2025-04-08 ENCOUNTER — ANESTHESIA EVENT (OUTPATIENT)
Dept: GASTROENTEROLOGY | Facility: HOSPITAL | Age: 25
End: 2025-04-08
Payer: COMMERCIAL

## 2025-04-08 ENCOUNTER — HOSPITAL ENCOUNTER (OUTPATIENT)
Facility: HOSPITAL | Age: 25
Setting detail: HOSPITAL OUTPATIENT SURGERY
Discharge: HOME | End: 2025-04-08
Attending: INTERNAL MEDICINE | Admitting: INTERNAL MEDICINE
Payer: COMMERCIAL

## 2025-04-08 ENCOUNTER — ANESTHESIA (OUTPATIENT)
Dept: GASTROENTEROLOGY | Facility: HOSPITAL | Age: 25
End: 2025-04-08
Payer: COMMERCIAL

## 2025-04-08 VITALS
OXYGEN SATURATION: 96 % | SYSTOLIC BLOOD PRESSURE: 95 MMHG | DIASTOLIC BLOOD PRESSURE: 49 MMHG | TEMPERATURE: 97.3 F | HEIGHT: 68 IN | BODY MASS INDEX: 18.79 KG/M2 | HEART RATE: 84 BPM | WEIGHT: 124 LBS | RESPIRATION RATE: 12 BRPM

## 2025-04-08 DIAGNOSIS — R11.0 NAUSEA: ICD-10-CM

## 2025-04-08 PROCEDURE — 88342 IMHCHEM/IMCYTCHM 1ST ANTB: CPT | Performed by: INTERNAL MEDICINE

## 2025-04-08 PROCEDURE — 25010000002 PROPOFOL 10 MG/ML EMULSION

## 2025-04-08 PROCEDURE — 25810000003 SODIUM CHLORIDE 0.9 % SOLUTION

## 2025-04-08 PROCEDURE — 43239 EGD BIOPSY SINGLE/MULTIPLE: CPT | Performed by: INTERNAL MEDICINE

## 2025-04-08 PROCEDURE — 25010000002 LIDOCAINE PF 2% 2 % SOLUTION

## 2025-04-08 PROCEDURE — 25810000003 SODIUM CHLORIDE 0.9 % SOLUTION: Performed by: NURSE ANESTHETIST, CERTIFIED REGISTERED

## 2025-04-08 PROCEDURE — 88305 TISSUE EXAM BY PATHOLOGIST: CPT | Performed by: INTERNAL MEDICINE

## 2025-04-08 RX ORDER — SODIUM CHLORIDE 9 MG/ML
INJECTION, SOLUTION INTRAVENOUS CONTINUOUS PRN
Status: DISCONTINUED | OUTPATIENT
Start: 2025-04-08 | End: 2025-04-08 | Stop reason: SURG

## 2025-04-08 RX ORDER — LIDOCAINE HYDROCHLORIDE 20 MG/ML
INJECTION, SOLUTION EPIDURAL; INFILTRATION; INTRACAUDAL; PERINEURAL AS NEEDED
Status: DISCONTINUED | OUTPATIENT
Start: 2025-04-08 | End: 2025-04-08 | Stop reason: SURG

## 2025-04-08 RX ORDER — SODIUM CHLORIDE 9 MG/ML
500 INJECTION, SOLUTION INTRAVENOUS ONCE
Status: COMPLETED | OUTPATIENT
Start: 2025-04-08 | End: 2025-04-08

## 2025-04-08 RX ORDER — SODIUM CHLORIDE 0.9 % (FLUSH) 0.9 %
10 SYRINGE (ML) INJECTION AS NEEDED
Status: DISCONTINUED | OUTPATIENT
Start: 2025-04-08 | End: 2025-04-08 | Stop reason: HOSPADM

## 2025-04-08 RX ORDER — PROPOFOL 10 MG/ML
VIAL (ML) INTRAVENOUS AS NEEDED
Status: DISCONTINUED | OUTPATIENT
Start: 2025-04-08 | End: 2025-04-08 | Stop reason: SURG

## 2025-04-08 RX ADMIN — LIDOCAINE HYDROCHLORIDE 100 MG: 20 INJECTION, SOLUTION EPIDURAL; INFILTRATION; INTRACAUDAL; PERINEURAL at 09:51

## 2025-04-08 RX ADMIN — SODIUM CHLORIDE: 9 INJECTION, SOLUTION INTRAVENOUS at 09:47

## 2025-04-08 RX ADMIN — PROPOFOL 200 MG: 10 INJECTION, EMULSION INTRAVENOUS at 09:51

## 2025-04-08 RX ADMIN — SODIUM CHLORIDE 500 ML: 9 INJECTION, SOLUTION INTRAVENOUS at 09:03

## 2025-04-08 NOTE — ANESTHESIA PREPROCEDURE EVALUATION
Anesthesia Evaluation     Patient summary reviewed   no history of anesthetic complications:   NPO Solid Status: > 8 hours  NPO Liquid Status: > 8 hours           Airway   Mallampati: I  TM distance: >3 FB  Neck ROM: full  No difficulty expected  Dental      Pulmonary - negative pulmonary ROS and normal exam   Cardiovascular - negative cardio ROS and normal exam  Exercise tolerance: excellent (>7 METS)        Neuro/Psych- negative ROS  (+) psychiatric history Anxiety  GI/Hepatic/Renal/Endo - negative ROS   (+) GERD    Musculoskeletal     Abdominal    Substance History      OB/GYN          Other                    Anesthesia Plan    ASA 1     MAC     intravenous induction     Anesthetic plan, risks, benefits, and alternatives have been provided, discussed and informed consent has been obtained with: patient.    CODE STATUS:

## 2025-04-08 NOTE — ANESTHESIA POSTPROCEDURE EVALUATION
"Patient: Tristian Velázquez    Procedure Summary       Date: 04/08/25 Room / Location: [facility] ENDOSCOPY 5 / [facility]    Anesthesia Start: 0948 Anesthesia Stop: 0958    Procedure: ESOPHAGOGASTRODUODENOSCOPY WITH ANESTHESIA Diagnosis:       Nausea      (Nausea [R11.0])    Surgeons: Torsten Duron DO Provider: Tomasz Thayer CRNA    Anesthesia Type: MAC ASA Status: 1            Anesthesia Type: MAC    Vitals  Vitals Value Taken Time   BP 92/47 04/08/25 10:00   Temp     Pulse 90 04/08/25 10:02   Resp     SpO2 95 % 04/08/25 10:02   Vitals shown include unfiled device data.        Post Anesthesia Care and Evaluation    Patient location during evaluation: PHASE II  Patient participation: complete - patient participated  Level of consciousness: awake  Pain management: adequate    Airway patency: patent  Anesthetic complications: No anesthetic complications    Cardiovascular status: acceptable  Respiratory status: acceptable  Hydration status: acceptable    Comments: /65 (BP Location: Right arm, Patient Position: Sitting)   Pulse 105   Temp 97.3 °F (36.3 °C) (Tympanic)   Resp 18   Ht 172.7 cm (68\")   Wt 56.2 kg (124 lb)   SpO2 99%   BMI 18.85 kg/m²       "

## 2025-04-09 LAB
LAB AP CASE REPORT: NORMAL
Lab: NORMAL
PATH REPORT.FINAL DX SPEC: NORMAL
PATH REPORT.GROSS SPEC: NORMAL

## 2025-04-14 ENCOUNTER — TELEPHONE (OUTPATIENT)
Dept: GASTROENTEROLOGY | Facility: CLINIC | Age: 25
End: 2025-04-14
Payer: COMMERCIAL

## 2025-04-14 NOTE — TELEPHONE ENCOUNTER
Left message with his Mother.   We are needing to speak with patient.     Patient did not answer when I called, could not leave message

## 2025-04-22 DIAGNOSIS — R11.0 NAUSEA: Primary | ICD-10-CM

## 2025-05-02 ENCOUNTER — HOSPITAL ENCOUNTER (OUTPATIENT)
Dept: ULTRASOUND IMAGING | Facility: HOSPITAL | Age: 25
Discharge: HOME | End: 2025-05-02
Admitting: INTERNAL MEDICINE
Payer: COMMERCIAL

## 2025-05-02 DIAGNOSIS — R11.0 NAUSEA: ICD-10-CM

## 2025-05-02 PROCEDURE — 76705 ECHO EXAM OF ABDOMEN: CPT

## 2025-05-05 ENCOUNTER — HOSPITAL ENCOUNTER (OUTPATIENT)
Dept: NUCLEAR MEDICINE | Facility: HOSPITAL | Age: 25
Discharge: HOME | End: 2025-05-05
Admitting: INTERNAL MEDICINE
Payer: COMMERCIAL

## 2025-05-05 DIAGNOSIS — R11.0 NAUSEA: ICD-10-CM

## 2025-05-05 PROCEDURE — A9537 TC99M MEBROFENIN: HCPCS | Performed by: INTERNAL MEDICINE

## 2025-05-05 PROCEDURE — 78226 HEPATOBILIARY SYSTEM IMAGING: CPT

## 2025-05-05 PROCEDURE — 34310000005 TECHNETIUM TC 99M MEBROFENIN KIT: Performed by: INTERNAL MEDICINE

## 2025-05-05 RX ORDER — KIT FOR THE PREPARATION OF TECHNETIUM TC 99M MEBROFENIN 45 MG/10ML
1 INJECTION, POWDER, LYOPHILIZED, FOR SOLUTION INTRAVENOUS
Status: COMPLETED | OUTPATIENT
Start: 2025-05-05 | End: 2025-05-05

## 2025-05-05 RX ADMIN — MEBROFENIN 1 DOSE: 45 INJECTION, POWDER, LYOPHILIZED, FOR SOLUTION INTRAVENOUS at 13:05

## 2025-05-07 ENCOUNTER — RESULTS FOLLOW-UP (OUTPATIENT)
Dept: GASTROENTEROLOGY | Facility: CLINIC | Age: 25
End: 2025-05-07
Payer: COMMERCIAL

## 2025-05-07 NOTE — TELEPHONE ENCOUNTER
----- Message from Torsten Duron sent at 5/7/2025  7:07 AM CDT -----  Let him know his GB EF was NORMAL  ----- Message -----  From: [place], Rad Results [place]  Sent: 5/5/2025   3:15 PM CDT  To: Torsten Duron, DO

## 2025-06-05 ENCOUNTER — OFFICE VISIT (OUTPATIENT)
Dept: FAMILY MEDICINE CLINIC | Facility: CLINIC | Age: 25
End: 2025-06-05
Payer: COMMERCIAL

## 2025-06-05 VITALS
HEIGHT: 68 IN | TEMPERATURE: 99.5 F | RESPIRATION RATE: 20 BRPM | OXYGEN SATURATION: 98 % | SYSTOLIC BLOOD PRESSURE: 121 MMHG | WEIGHT: 126 LBS | HEART RATE: 105 BPM | DIASTOLIC BLOOD PRESSURE: 75 MMHG | BODY MASS INDEX: 19.1 KG/M2

## 2025-06-05 DIAGNOSIS — K21.9 GASTROESOPHAGEAL REFLUX DISEASE WITHOUT ESOPHAGITIS: ICD-10-CM

## 2025-06-05 DIAGNOSIS — K90.49 GASTROINTESTINAL INTOLERANCE TO FOODS: ICD-10-CM

## 2025-06-05 DIAGNOSIS — R11.0 NAUSEA: Primary | ICD-10-CM

## 2025-06-05 PROCEDURE — 99213 OFFICE O/P EST LOW 20 MIN: CPT | Performed by: NURSE PRACTITIONER

## 2025-06-05 RX ORDER — OMEPRAZOLE 40 MG/1
40 CAPSULE, DELAYED RELEASE ORAL 2 TIMES DAILY
Qty: 60 CAPSULE | Refills: 2 | Status: SHIPPED | OUTPATIENT
Start: 2025-06-05

## 2025-06-05 RX ORDER — SUCRALFATE 1 G/1
1 TABLET ORAL 4 TIMES DAILY
Qty: 120 TABLET | Refills: 0 | Status: SHIPPED | OUTPATIENT
Start: 2025-06-05

## 2025-06-05 NOTE — PROGRESS NOTES
Chief Complaint  Nausea (Pt states that he has been having severe nausea )    Subjective        Tristian Velázquez presents to [facility]  History of Present Illness  The patient is a 24-year-old male who presents today for continued vomiting and severe nausea occurring for about 7 months.    He has been experiencing persistent nausea for approximately 7 months, which occurs daily. Despite attempts to manage the symptoms with omeprazole, Zofran, and Protonix, the nausea persists. He discontinued omeprazole on 05/31/2025 to assess its effectiveness but had to leave work on 06/03/2025 due to severe nausea. He resumed omeprazole today. He has not yet tried Carafate. He reports that his nausea is most pronounced between 9:00 AM and 12:00 PM, and again from 9:00 PM to 12:00 AM, but is less bothersome during the day. He has not vomited but feels close to it. He reports no dizziness. He does not use marijuana excessively and only consumes alcohol occasionally. He reports no new medications prior to the onset of these symptoms. He experiences acid reflux in the back of his throat. He has not consumed milk in years. His symptoms have significantly impacted his daily life, causing him to miss work. He has undergone an endoscopy and his gallbladder function was found to be normal via ultrasound. He has identified coffee and tomato-based products as triggers for his nausea but does not consume many acidic foods. He does not experience pain after consuming coffee but does feel acid in his stomach.    He also experienced transient epigastric pain a few times, but it has not recurred recently. He describes a sensation in his stomach, but it is not painful.    SOCIAL HISTORY  He does not use marijuana excessively. He occasionally uses alcohol.    FAMILY HISTORY  His mother has migraines. His grandmother was put on amitriptyline for reflux and had a sensitive esophagus.    Objective   Vital Signs:  BP  "121/75 (BP Location: Left arm, Patient Position: Sitting, Cuff Size: Large Adult)   Pulse 105   Temp 99.5 °F (37.5 °C) (Infrared)   Resp 20   Ht 172.7 cm (68\")   Wt 57.2 kg (126 lb)   SpO2 98%   BMI 19.16 kg/m²   Estimated body mass index is 19.16 kg/m² as calculated from the following:    Height as of this encounter: 172.7 cm (68\").    Weight as of this encounter: 57.2 kg (126 lb).       BMI is within normal parameters. No other follow-up for BMI required.      Physical Exam  Vitals and nursing note reviewed.   Constitutional:       General: He is not in acute distress.     Appearance: He is well-developed.   HENT:      Nose: Nose normal.   Eyes:      Conjunctiva/sclera: Conjunctivae normal.   Neck:      Thyroid: No thyromegaly.   Cardiovascular:      Rate and Rhythm: Normal rate and regular rhythm.      Heart sounds: Normal heart sounds.   Pulmonary:      Effort: Pulmonary effort is normal.      Breath sounds: Normal breath sounds.   Abdominal:      General: Bowel sounds are normal.      Palpations: Abdomen is soft. There is no mass.      Tenderness: There is no abdominal tenderness.   Musculoskeletal:      Cervical back: Neck supple.   Lymphadenopathy:      Cervical: No cervical adenopathy.   Skin:     General: Skin is warm and dry.   Neurological:      Mental Status: He is alert.   Psychiatric:         Mood and Affect: Mood normal.        Physical Exam  Neurological: Awake, alert, oriented x4, no focal deficit  Head: Normocephalic, atraumatic  Ears: External ear canals and tympanic membranes intact  Eyes: Pupils equal and round, conjunctivae clear  Nose: Septum midline, nares patent, mucosa normal  Mouth/Throat: Mucous membranes moist, no erythema, no exudate  Neck: Supple, no abnormalities  Respiratory: Clear to auscultation, no wheezing, rales or rhonchi  Cardiovascular: Regular rate and rhythm, no murmurs, rubs, or gallops  Gastrointestinal: Soft, no tenderness, no distention, no masses  Extremities: " No edema, no cyanosis  Musculoskeletal: No joint or muscular abnormalities noted  Skin: No abnormalities, no rashes or lesions    Result Review :          Results  - Imaging:    - Ultrasound of the gallbladder: Normal gallbladder function           Assessment and Plan     Diagnoses and all orders for this visit:    1. Nausea (Primary)  -     Alpha-Gal IgE Panel  -     Allergens (52) Food  -     TSH  -     Comprehensive metabolic panel  -     PTH, Intact  -     Cortisol - AM  -     Hemoglobin A1c  -     Amylase  -     Lipase  -     CT Abdomen Pelvis With Contrast; Future  -     NM gastric emptying; Future    2. Gastroesophageal reflux disease without esophagitis  -     NM gastric emptying; Future    3. Gastrointestinal intolerance to foods  -     Alpha-Gal IgE Panel  -     Allergens (52) Food    Other orders  -     sucralfate (Carafate) 1 g tablet; Take 1 tablet by mouth 4 (Four) Times a Day.  Dispense: 120 tablet; Refill: 0  -     omeprazole (priLOSEC) 40 MG capsule; Take 1 capsule by mouth 2 (Two) Times a Day.  Dispense: 60 capsule; Refill: 2      Assessment & Plan  1. Nausea: Chronic.  - Severe nausea has persisted for approximately 7 months, with daily occurrences. Omeprazole was stopped last Saturday, leading to increased nausea by Tuesday, prompting leaving work. Omeprazole was resumed today.  - Increase omeprazole dosage to twice daily.  - Introduce Carafate, to be taken 30 minutes before meals, four times daily.  - Conduct a gastric emptying study and a CT scan of the abdomen to rule out underlying conditions.  - Consider amitriptyline or nortriptyline if symptoms persist.    2. Epigastric pain.  - Initially experienced fleeting epigastric pain, which has not recurred recently. Reports a sensation in the stomach without significant pain.  - Continue with the increased dose of omeprazole and the addition of Carafate.  - Further evaluation will be considered if symptoms persist.    Follow-up  - Follow up on  06/30/2025.       Follow Up     Return in about 1 month (around 7/5/2025) for Recheck.  Patient was given instructions and counseling regarding his condition or for health maintenance advice. Please see specific information pulled into the AVS if appropriate.       Patient or patient representative verbalized consent for the use of Ambient Listening during the visit with  HARDIK Diaz for chart documentation. 6/5/2025  17:09 CDT

## 2025-06-10 ENCOUNTER — RESULTS FOLLOW-UP (OUTPATIENT)
Dept: FAMILY MEDICINE CLINIC | Facility: CLINIC | Age: 25
End: 2025-06-10
Payer: COMMERCIAL

## 2025-06-10 LAB
A-LACTALB IGE QN: 0.17 KU/L
ALBUMIN SERPL-MCNC: 5.5 G/DL (ref 3.5–5.2)
ALBUMIN/GLOB SERPL: 2.1 G/DL
ALMOND IGE QN: 0.16 KU/L
ALP SERPL-CCNC: 110 U/L (ref 39–117)
ALPHA-GAL IGE QN: <0.1 KU/L
ALT SERPL-CCNC: 16 U/L (ref 1–41)
AMYLASE SERPL-CCNC: 69 U/L (ref 28–100)
APPLE IGE QN: 0.17 KU/L
AST SERPL-CCNC: 22 U/L (ref 1–40)
AVOCADO IGE QN: <0.1 KU/L
BAKER'S YEAST IGE QN: <0.1 KU/L
BANANA IGE QN: 0.22 KU/L
BEEF IGE QN: 0.17 KU/L
BILIRUB SERPL-MCNC: 0.5 MG/DL (ref 0–1.2)
BLACK PEPPER IGE QN: <0.1 KU/L
BROCCOLI IGE QN: <0.1 KU/L
BUN SERPL-MCNC: 14 MG/DL (ref 6–20)
BUN/CREAT SERPL: 15.2 (ref 7–25)
CABBAGE IGE QN: <0.1 KU/L
CALCIUM SERPL-MCNC: 10.6 MG/DL (ref 8.6–10.5)
CARROT IGE QN: <0.1 KU/L
CASHEW NUT IGE QN: <0.1 KU/L
CHEESE MOLD IGE QN: 0.24 KU/L
CHICKEN MEAT IGE QN: <0.1 KU/L
CHLORIDE SERPL-SCNC: 102 MMOL/L (ref 98–107)
CO2 SERPL-SCNC: 26.6 MMOL/L (ref 22–29)
COCOA IGE QN: <0.1 KU/L
COFFEE IGE QN: <0.1 KU/L
CONV CLASS DESCRIPTION: ABNORMAL
CORN IGE QN: <0.1 KU/L
CORTIS AM PEAK SERPL-MCNC: 17.3 UG/DL (ref 6.2–19.4)
COW MILK IGE QN: 0.67 KU/L
CRAB IGE QN: <0.1 KU/L
CREAT SERPL-MCNC: 0.92 MG/DL (ref 0.76–1.27)
EGFRCR SERPLBLD CKD-EPI 2021: 120.6 ML/MIN/1.73
EGG WHITE IGE QN: 0.46 KU/L
EGG YOLK IGE QN: 0.12 KU/L
GARLIC IGE QN: 0.13 KU/L
GLOBULIN SER CALC-MCNC: 2.6 GM/DL
GLUCOSE SERPL-MCNC: 70 MG/DL (ref 65–99)
GRAPE IGE QN: <0.1 KU/L
GREEN BEAN IGE QN: 0.1 KU/L
HADDOCK IGE QN: <0.1 KU/L
HALIBUT IGE QN: <0.1 KU/L
HAZELNUT IGE QN: 0.11 KU/L
HBA1C MFR BLD: 4.6 % (ref 4.8–5.6)
IGE SERPL-ACNC: 94 IU/ML (ref 6–495)
LAMB IGE QN: 0.17 KU/L
LEMON IGE QN: <0.1 KU/L
LIPASE SERPL-CCNC: 26 U/L (ref 13–60)
MELON IGE QN: <0.1 KU/L
MUSHROOM IGE QN: <0.1 KU/L
OAT IGE QN: 0.24 KU/L
ONION IGE QN: <0.1 KU/L
ORANGE IGE QN: <0.1 KU/L
OYSTER IGE QN: <0.1 KU/L
PAPRIKA IGE QN: <0.1 KU/L
PEANUT IGE QN: 0.27 KU/L
PORK IGE QN: 3.45 KU/L
POTASSIUM SERPL-SCNC: 4.3 MMOL/L (ref 3.5–5.2)
POTATO IGE QN: <0.1 KU/L
PROT SERPL-MCNC: 8.1 G/DL (ref 6–8.5)
PTH-INTACT SERPL-MCNC: 12 PG/ML (ref 15–65)
RICE IGE QN: <0.1 KU/L
RYE IGE QN: 0.28 KU/L
SALMON IGE QN: <0.1 KU/L
SHRIMP IGE QN: <0.1 KU/L
SODIUM SERPL-SCNC: 142 MMOL/L (ref 136–145)
SOYBEAN IGE QN: <0.1 KU/L
STRAWBERRY IGE QN: <0.1 KU/L
TEA IGE QN: <0.1 KU/L
TOMATO IGE QN: <0.1 KU/L
TSH SERPL DL<=0.005 MIU/L-ACNC: 2.78 UIU/ML (ref 0.27–4.2)
TUNA IGE QN: <0.1 KU/L
TURKEY MEAT IGE QN: <0.1 KU/L
VANILLA IGE QN: <0.1 KU/L
WHEAT IGE QN: 0.54 KU/L
WHOLE EGG IGE QN: 0.41 KU/L

## 2025-06-16 ENCOUNTER — TELEMEDICINE (OUTPATIENT)
Dept: FAMILY MEDICINE CLINIC | Facility: CLINIC | Age: 25
End: 2025-06-16
Payer: COMMERCIAL

## 2025-06-16 DIAGNOSIS — Z71.2 ENCOUNTER TO DISCUSS TEST RESULTS: ICD-10-CM

## 2025-06-16 DIAGNOSIS — Z91.014 ALPHA-GAL SYNDROME: ICD-10-CM

## 2025-06-16 DIAGNOSIS — E21.5 PARATHYROID ABNORMALITY: ICD-10-CM

## 2025-06-16 DIAGNOSIS — Z91.018 MULTIPLE FOOD ALLERGIES: Primary | ICD-10-CM

## 2025-06-16 PROCEDURE — 99213 OFFICE O/P EST LOW 20 MIN: CPT | Performed by: NURSE PRACTITIONER

## 2025-06-16 NOTE — PROGRESS NOTES
"Chief Complaint  No chief complaint on file.    Subjective        Tristian Velázquez presents to [facility]  History of Present Illness  History of Present Illness  This was an audio and video enabled telemedicine encounter. I am located at [facility] and he is in his home.   He is a pt of [facility] and made appt to review his abnormal labs.       The patient is a 24-year-old male who presents via virtual visit to discuss lab results.    He reports experiencing episodes of weakness, shakiness, and lightheadedness, which he attributes to hypoglycemia. These symptoms frequently occur during his work hours, leaving him unable to take immediate action and necessitating a period of rest until the symptoms subside.    He has been diagnosed with alpha-gal syndrome, a condition that has resulted in allergies to beef, pork, and lamb. He does not recall any tick bites and expresses surprise at the diagnosis due to his limited outdoor activities. His diet includes chicken, and he recently consumed sausage without any adverse reactions. He also consumes peanut butter regularly. He has experienced nausea as a symptom of his condition.    The following portions of the patient's history were reviewed and updated as appropriate: allergies, current medications, past family history, past medical history, past social history, past surgical history and problem list.    Objective   Vital Signs:  There were no vitals taken for this visit.  Estimated body mass index is 19.16 kg/m² as calculated from the following:    Height as of 6/5/25: 172.7 cm (68\").    Weight as of 6/5/25: 57.2 kg (126 lb).       BMI is within normal parameters. No other follow-up for BMI required.    No physical exam  today       Physical Exam      Result Review :          Results  Labs   - Hemoglobin A1c: 4.60   - Lipase: Normal   - Amylase: Normal   - Cortisol: Negative   - Parathyroid hormone (PTH): Low   - Thyroid level " (TSH): Normal   - Calcium: 10.6   - Albumin: 5.5   - Liver enzymes: Normal   - Alpha gal test: High levels of allergies to mammalian meat   - Food allergen test: Allergies to egg white, cow's milk, wheat, rye, oats, peanut butter, hazelnut, almonds, garlic, apple, egg yolk, mold cheese, bananas, all eggs, and green beans           Assessment and Plan     Diagnoses and all orders for this visit:    1. Multiple food allergies (Primary)    2. Encounter to discuss test results    3. Alpha-gal syndrome    4. Parathyroid abnormality      Assessment & Plan  1. Hypoglycemia.  - Reports experiencing symptoms such as weakness, shakiness, and dizziness, particularly during work, which may indicate low blood sugar levels.  - Advised to consume protein-rich foods like crackers and peanut butter or use glucose tablets during these episodes.  - Can drink a diet soda or place a small amount of sugar or jelly under the tongue for quick relief.  - Monitoring for further symptoms and advised on appropriate interventions.    2. Alpha-gal syndrome.  - Diagnosed with alpha-gal syndrome, causing allergies to certain foods and products containing mammalian meat.  - Advised to avoid mammalian meat and products containing gelatin capsules, as well as foods he is allergic to, such as egg white, cow's milk, wheat, rye, oats, peanut butter, hazelnut, almonds, garlic, apple, egg yolk, mold cheese, bananas, all eggs, and green beans.  - Encouraged to join the Alpha Gal [employer] Alpha [employer] on [employer] for additional support and information.  - Advised to keep liquid Benadryl at home and take 10 mL (2 teaspoons) if experiencing an allergic reaction. If severe reactions occur, contact the office for a potential EpiPen prescription.    3. Elevated calcium levels.  - Calcium levels slightly elevated at 10.6 mg/dL.  - Advised to decrease dietary calcium intake.  - Monitoring calcium levels for any further changes.    4. Elevated albumin  levels.  - Albumin levels slightly elevated at 5.5 g/dL.  - Monitoring for any further changes.  - No immediate intervention required at this time.      ICD-10-CM ICD-9-CM   1. Multiple food allergies  Z91.018 V15.05   2. Encounter to discuss test results  Z71.2 V65.49   3. Alpha-gal syndrome  Z91.014 V15.05   4. Parathyroid abnormality  E21.5 252.9          I spent 30 minutes caring for Tristian on this date of service. This time includes time spent by me in the following activities:preparing for the visit, reviewing tests, obtaining and/or reviewing a separately obtained history, performing a medically appropriate examination and/or evaluation , counseling and educating the patient/family/caregiver, documenting information in the medical record, and care coordination      Follow Up     Return in about 1 week (around 6/23/2025), or if symptoms worsen or fail to improve.  Patient was given instructions and counseling regarding his condition or for health maintenance advice. Please see specific information pulled into the AVS if appropriate.       Patient or patient representative verbalized consent for the use of Ambient Listening during the visit with  Ailyn Holder DNP, APRTAMMY for chart documentation. 6/16/2025  10:03 CDT    Electronically signed by Ailyn Holder DNP, APRN, 06/16/25, 10:04 AM CDT.

## 2025-06-26 ENCOUNTER — HOSPITAL ENCOUNTER (OUTPATIENT)
Dept: NUCLEAR MEDICINE | Facility: HOSPITAL | Age: 25
Discharge: HOME | End: 2025-06-26
Payer: COMMERCIAL

## 2025-06-26 DIAGNOSIS — R11.0 NAUSEA: ICD-10-CM

## 2025-06-26 DIAGNOSIS — K21.9 GASTROESOPHAGEAL REFLUX DISEASE WITHOUT ESOPHAGITIS: ICD-10-CM

## 2025-06-26 PROCEDURE — 34310000005 TECHNETIUM SULFUR COLLOID: Performed by: NURSE PRACTITIONER

## 2025-06-26 PROCEDURE — 78264 GASTRIC EMPTYING IMG STUDY: CPT

## 2025-06-26 PROCEDURE — A9541 TC99M SULFUR COLLOID: HCPCS | Performed by: NURSE PRACTITIONER

## 2025-06-26 RX ADMIN — TECHNETIUM TC 99M SULFUR COLLOID 1 DOSE: KIT at 07:35

## 2025-06-30 ENCOUNTER — OFFICE VISIT (OUTPATIENT)
Dept: FAMILY MEDICINE CLINIC | Facility: CLINIC | Age: 25
End: 2025-06-30
Payer: COMMERCIAL

## 2025-06-30 VITALS
BODY MASS INDEX: 19.1 KG/M2 | RESPIRATION RATE: 20 BRPM | OXYGEN SATURATION: 97 % | TEMPERATURE: 98.4 F | HEART RATE: 71 BPM | WEIGHT: 126 LBS | HEIGHT: 68 IN | SYSTOLIC BLOOD PRESSURE: 111 MMHG | DIASTOLIC BLOOD PRESSURE: 71 MMHG

## 2025-06-30 DIAGNOSIS — K31.84 GASTROPARESIS: ICD-10-CM

## 2025-06-30 DIAGNOSIS — Z91.018 MULTIPLE FOOD ALLERGIES: ICD-10-CM

## 2025-06-30 DIAGNOSIS — R11.0 NAUSEA: Primary | ICD-10-CM

## 2025-06-30 PROCEDURE — 99213 OFFICE O/P EST LOW 20 MIN: CPT | Performed by: NURSE PRACTITIONER

## 2025-06-30 RX ORDER — METOCLOPRAMIDE 5 MG/1
5 TABLET ORAL 4 TIMES DAILY PRN
Qty: 30 TABLET | Refills: 0 | Status: SHIPPED | OUTPATIENT
Start: 2025-06-30

## 2025-07-03 NOTE — PROGRESS NOTES
"Chief Complaint  Alpha-Gal (Follow up )    Subjective        Tristian Velázquez presents to [facility]  History of Present Illness  Here for follow up on recent testing  Completed labs showed several food allergies which he felt he was sensitive to  Also completed gastric emptying study which was abnormal   He has had nausea for 8 months now persistent daily, worse after eating   Egd moderate esophagitis without bleeding, labs normal, us and hida scan normal   Had tried and failed ppi, carafate. Reflux and epigastric pain improved.   Zofran helps but not all the time  Reports family history of \"lower stomach\" narrowing that causes this issue with grandmother    Ct ordered but scheduled in a few weeks       Objective   Vital Signs:  /71 (BP Location: Right arm, Patient Position: Sitting, Cuff Size: Adult)   Pulse 71   Temp 98.4 °F (36.9 °C) (Infrared)   Resp 20   Ht 172.7 cm (68\")   Wt 57.2 kg (126 lb)   SpO2 97%   BMI 19.16 kg/m²   Estimated body mass index is 19.16 kg/m² as calculated from the following:    Height as of this encounter: 172.7 cm (68\").    Weight as of this encounter: 57.2 kg (126 lb).    BMI is within normal parameters. No other follow-up for BMI required.      Physical Exam  Vitals and nursing note reviewed.   Constitutional:       General: He is not in acute distress.     Appearance: He is well-developed.   HENT:      Nose: Nose normal.   Eyes:      Conjunctiva/sclera: Conjunctivae normal.   Neck:      Thyroid: No thyromegaly.   Cardiovascular:      Rate and Rhythm: Normal rate and regular rhythm.      Heart sounds: Normal heart sounds.   Pulmonary:      Effort: Pulmonary effort is normal.      Breath sounds: Normal breath sounds.   Abdominal:      General: Bowel sounds are normal.      Palpations: Abdomen is soft. There is no mass.      Tenderness: There is no abdominal tenderness.   Musculoskeletal:      Cervical back: Neck supple.   Lymphadenopathy: "      Cervical: No cervical adenopathy.   Skin:     General: Skin is warm and dry.   Neurological:      Mental Status: He is alert.   Psychiatric:         Mood and Affect: Mood normal.        Result Review :    NM Gastric Emptying  Result Date: 6/26/2025  Narrative: EXAMINATION: NM GASTRIC EMPTYING-  6/26/2025 11:54 AM  HISTORY: Persistent nausea x7 months.  FINDINGS: Following ingestion of 773 uCi of Tc 99m sulfur colloid in eggs with toast and water an area of interest was drawn over the gastric lumen. Count rates were obtained for a total of 218 minutes and from this information a time/activity curve and half emptying time calculated. The half-emptying time calculated is 157 minutes which is delayed with normal generally considered to be less than 90 minutes. At 45 minutes only 5% of activity had been emptied from the gastric lumen. At 107 minutes 22% of activity had been emptied from the gastric lumen and at 168 minutes 56% of activity had been emptied from the gastric lumen.      Impression: 1. Scintigraphic findings suggesting gastroparesis with a half-emptying time for solid-phase emptying calculated at 157 minutes.  This report was signed and finalized on 6/26/2025 11:56 AM by Dr. Rey Montana MD.                    Assessment and Plan   Diagnoses and all orders for this visit:    1. Nausea (Primary)  -     Ambulatory Referral to Gastroenterology    2. Gastroparesis  -     Ambulatory Referral to Gastroenterology    3. Multiple food allergies    Other orders  -     metoclopramide (Reglan) 5 MG tablet; Take 1 tablet by mouth 4 (Four) Times a Day As Needed (nausea).  Dispense: 30 tablet; Refill: 0        Plan:  Options discussed with pt regarding gastroparesis- pt opted to discuss with local gi for treatment options and recommendations as well as additional testing   Will start low dose reglan, discussed risk of TD and recommend keeping low doses as infrequent as possible to be effective   Continu with ct  ordered in a few weeks to rule out mechanical cause to delayed gastric emptying   May stop carafate  Continue ppi   Avoid food allergies          Follow Up   Return if symptoms worsen or fail to improve.  Patient was given instructions and counseling regarding his condition or for health maintenance advice. Please see specific information pulled into the AVS if appropriate.

## 2025-07-15 ENCOUNTER — HOSPITAL ENCOUNTER (OUTPATIENT)
Dept: CT IMAGING | Facility: HOSPITAL | Age: 25
Discharge: HOME | End: 2025-07-15
Admitting: NURSE PRACTITIONER
Payer: COMMERCIAL

## 2025-07-15 DIAGNOSIS — R11.0 NAUSEA: ICD-10-CM

## 2025-07-15 PROCEDURE — 74177 CT ABD & PELVIS W/CONTRAST: CPT

## 2025-07-15 PROCEDURE — 25510000001 IOPAMIDOL 61 % SOLUTION: Performed by: NURSE PRACTITIONER

## 2025-07-15 RX ORDER — IOPAMIDOL 612 MG/ML
100 INJECTION, SOLUTION INTRAVASCULAR
Status: COMPLETED | OUTPATIENT
Start: 2025-07-15 | End: 2025-07-15

## 2025-07-15 RX ADMIN — IOPAMIDOL 100 ML: 612 INJECTION, SOLUTION INTRAVENOUS at 08:59

## 2025-08-06 ENCOUNTER — OFFICE VISIT (OUTPATIENT)
Dept: GASTROENTEROLOGY | Facility: CLINIC | Age: 25
End: 2025-08-06
Payer: COMMERCIAL

## 2025-08-06 VITALS
WEIGHT: 126 LBS | BODY MASS INDEX: 19.1 KG/M2 | DIASTOLIC BLOOD PRESSURE: 70 MMHG | OXYGEN SATURATION: 98 % | HEIGHT: 68 IN | TEMPERATURE: 97.8 F | HEART RATE: 96 BPM | SYSTOLIC BLOOD PRESSURE: 110 MMHG

## 2025-08-06 DIAGNOSIS — R11.0 NAUSEA: Primary | ICD-10-CM

## 2025-08-06 PROCEDURE — 99213 OFFICE O/P EST LOW 20 MIN: CPT | Performed by: CLINICAL NURSE SPECIALIST

## 2025-08-11 ENCOUNTER — OFFICE VISIT (OUTPATIENT)
Dept: FAMILY MEDICINE CLINIC | Facility: CLINIC | Age: 25
End: 2025-08-11
Payer: COMMERCIAL

## 2025-08-11 VITALS
RESPIRATION RATE: 18 BRPM | WEIGHT: 125.4 LBS | HEIGHT: 68 IN | SYSTOLIC BLOOD PRESSURE: 110 MMHG | BODY MASS INDEX: 19 KG/M2 | TEMPERATURE: 98.4 F | DIASTOLIC BLOOD PRESSURE: 68 MMHG | HEART RATE: 88 BPM | OXYGEN SATURATION: 98 %

## 2025-08-11 DIAGNOSIS — Z91.018 MULTIPLE FOOD ALLERGIES: ICD-10-CM

## 2025-08-11 DIAGNOSIS — K31.84 GASTROPARESIS: ICD-10-CM

## 2025-08-11 DIAGNOSIS — F41.9 ANXIETY: ICD-10-CM

## 2025-08-11 DIAGNOSIS — R11.0 NAUSEA: ICD-10-CM

## 2025-08-11 DIAGNOSIS — Z00.00 ANNUAL PHYSICAL EXAM: Primary | ICD-10-CM

## 2025-08-11 DIAGNOSIS — E78.01 FAMILIAL HYPERCHOLESTEROLEMIA: ICD-10-CM

## 2025-08-11 DIAGNOSIS — K21.9 GASTROESOPHAGEAL REFLUX DISEASE WITHOUT ESOPHAGITIS: ICD-10-CM

## 2025-08-11 PROCEDURE — 99395 PREV VISIT EST AGE 18-39: CPT | Performed by: NURSE PRACTITIONER

## 2025-08-11 RX ORDER — MIRTAZAPINE 7.5 MG/1
7.5 TABLET, FILM COATED ORAL NIGHTLY
Qty: 30 TABLET | Refills: 2 | Status: SHIPPED | OUTPATIENT
Start: 2025-08-11 | End: 2025-08-11

## 2025-08-11 RX ORDER — OMEPRAZOLE 40 MG/1
40 CAPSULE, DELAYED RELEASE ORAL DAILY
Start: 2025-08-11

## 2025-08-11 RX ORDER — CHLORAL HYDRATE 500 MG
CAPSULE ORAL
COMMUNITY

## 2025-08-11 RX ORDER — MIRTAZAPINE 7.5 MG/1
7.5 TABLET, FILM COATED ORAL NIGHTLY
Qty: 30 TABLET | Refills: 2 | Status: SHIPPED | OUTPATIENT
Start: 2025-08-11

## 2025-08-12 LAB
CHOLEST SERPL-MCNC: 147 MG/DL (ref 0–200)
HDLC SERPL-MCNC: 38 MG/DL (ref 40–60)
LDLC SERPL CALC-MCNC: 100 MG/DL (ref 0–100)
TRIGL SERPL-MCNC: 42 MG/DL (ref 0–150)
VLDLC SERPL CALC-MCNC: 9 MG/DL (ref 5–40)

## (undated) DEVICE — THE CHANNEL CLEANING BRUSH IS A NYLON FLEXI BRUSH ATTACHED TO A FLEXIBLE PLASTIC SHEATH DESIGNED TO SAFELY REMOVE DEBRIS FROM FLEXIBLE ENDOSCOPES.

## (undated) DEVICE — SENSR O2 OXIMAX FNGR A/ 18IN NONSTR

## (undated) DEVICE — CONMED SCOPE SAVER BITE BLOCK, 20X27 MM: Brand: SCOPE SAVER

## (undated) DEVICE — ARGYLE YANKAUER BULB TIP WITH VENT: Brand: ARGYLE

## (undated) DEVICE — FRCP BX RADJAW4 NDL 2.8 240 STD OG

## (undated) DEVICE — CUFF,BP,DISP,1 TUBE,ADULT,HP: Brand: MEDLINE

## (undated) DEVICE — DEFENDO AIR WATER SUCTION AND BIOPSY VALVE KIT FOR  OLYMPUS: Brand: DEFENDO AIR/WATER/SUCTION AND BIOPSY VALVE